# Patient Record
Sex: FEMALE | Race: WHITE | Employment: UNEMPLOYED | URBAN - METROPOLITAN AREA
[De-identification: names, ages, dates, MRNs, and addresses within clinical notes are randomized per-mention and may not be internally consistent; named-entity substitution may affect disease eponyms.]

---

## 2017-02-15 ENCOUNTER — OFFICE VISIT (OUTPATIENT)
Dept: FAMILY MEDICINE CLINIC | Facility: CLINIC | Age: 47
End: 2017-02-15

## 2017-02-15 VITALS
BODY MASS INDEX: 19 KG/M2 | WEIGHT: 116 LBS | HEART RATE: 63 BPM | OXYGEN SATURATION: 99 % | RESPIRATION RATE: 18 BRPM | TEMPERATURE: 98 F

## 2017-02-15 DIAGNOSIS — Z20.818 STREP THROAT EXPOSURE: ICD-10-CM

## 2017-02-15 DIAGNOSIS — J02.9 ACUTE PHARYNGITIS, UNSPECIFIED ETIOLOGY: Primary | ICD-10-CM

## 2017-02-15 LAB
CONTROL LINE PRESENT WITH A CLEAR BACKGROUND (YES/NO): YES YES/NO
STREP GRP A CUL-SCR: NEGATIVE

## 2017-02-15 PROCEDURE — 99213 OFFICE O/P EST LOW 20 MIN: CPT | Performed by: NURSE PRACTITIONER

## 2017-02-15 PROCEDURE — 87880 STREP A ASSAY W/OPTIC: CPT | Performed by: NURSE PRACTITIONER

## 2017-02-15 NOTE — PROGRESS NOTES
CHIEF COMPLAINT:   Patient presents with:  Sore Throat: spouse and son with strep, here with daugther to r/o strep        HPI:   Zacarias Moya is a 42year old female presents to clinic with complaint of sore throat.  Hx of acid reflex, throat irritation non-injected, no bulging, retraction, or fluid bilaterally  NOSE: nostrils patent, no nasal discharge, right turbinate mildly edematous no erythema. THROAT: oral mucosa pink, moist. Posterior pharynx non erythematous, no exudate.    NECK: supple  LUNGS: cl

## 2017-02-27 ENCOUNTER — APPOINTMENT (OUTPATIENT)
Dept: CT IMAGING | Facility: HOSPITAL | Age: 47
End: 2017-02-27
Attending: EMERGENCY MEDICINE
Payer: COMMERCIAL

## 2017-02-27 ENCOUNTER — HOSPITAL ENCOUNTER (EMERGENCY)
Facility: HOSPITAL | Age: 47
Discharge: HOME | End: 2017-02-28
Attending: EMERGENCY MEDICINE
Payer: COMMERCIAL

## 2017-02-27 ENCOUNTER — HOSPITAL ENCOUNTER (OUTPATIENT)
Age: 47
Discharge: ANOTHER HOSPITAL | End: 2017-02-27
Attending: EMERGENCY MEDICINE
Payer: COMMERCIAL

## 2017-02-27 ENCOUNTER — APPOINTMENT (OUTPATIENT)
Dept: MRI IMAGING | Facility: HOSPITAL | Age: 47
End: 2017-02-27
Attending: EMERGENCY MEDICINE
Payer: COMMERCIAL

## 2017-02-27 VITALS
OXYGEN SATURATION: 100 % | HEART RATE: 63 BPM | HEIGHT: 65 IN | DIASTOLIC BLOOD PRESSURE: 71 MMHG | BODY MASS INDEX: 19.66 KG/M2 | RESPIRATION RATE: 19 BRPM | WEIGHT: 118 LBS | TEMPERATURE: 98 F | SYSTOLIC BLOOD PRESSURE: 126 MMHG

## 2017-02-27 VITALS
DIASTOLIC BLOOD PRESSURE: 71 MMHG | TEMPERATURE: 98 F | SYSTOLIC BLOOD PRESSURE: 110 MMHG | OXYGEN SATURATION: 98 % | HEART RATE: 59 BPM | RESPIRATION RATE: 16 BRPM

## 2017-02-27 DIAGNOSIS — R20.2 PARESTHESIAS: Primary | ICD-10-CM

## 2017-02-27 DIAGNOSIS — M54.12 CERVICAL RADICULOPATHY: Primary | ICD-10-CM

## 2017-02-27 PROCEDURE — 70450 CT HEAD/BRAIN W/O DYE: CPT

## 2017-02-27 PROCEDURE — 72141 MRI NECK SPINE W/O DYE: CPT | Performed by: RADIOLOGY

## 2017-02-27 PROCEDURE — 99215 OFFICE O/P EST HI 40 MIN: CPT

## 2017-02-27 PROCEDURE — 99284 EMERGENCY DEPT VISIT MOD MDM: CPT

## 2017-02-27 NOTE — ED INITIAL ASSESSMENT (HPI)
Pt states that last night before bed she started having some numbness to her Right arm. Pt denies any injury but does lift weights twice a week. Pt is able to move her arm and does not have any pain to her rt arm.   Pt has been using her rt arm all day wit

## 2017-02-28 RX ORDER — METHYLPREDNISOLONE 4 MG/1
TABLET ORAL
Qty: 1 PACKAGE | Refills: 0 | Status: SHIPPED | OUTPATIENT
Start: 2017-02-28 | End: 2017-03-05

## 2017-02-28 NOTE — ED PROVIDER NOTES
Patient Seen in: [facility] AND [facility] Emergency Department    History   Patient presents with:  Numbness Weakness (neurologic)    Stated Complaint: R arm numbness    HPI  The patient is a 27-year-old female who presents to the emergency department with righ Onset   • Heart Disease Father    • Prostate Cancer Father    • Heart Disease Paternal Grandfather    • Depression Other          Smoking Status: Former Smoker                   Packs/Day: 0.50  Years: 21        Quit date: 07/16/2010    Alcohol Use: Yes tenderness (Anterior and lateral soft tissue). She exhibits normal range of motion, no bony tenderness, no swelling, no effusion, normal pulse and normal strength. Neurological: She is alert and oriented to person, place, and time.  She has normal strengt Marquise Hawley M.D. Radiology exams  Viewed and reviewed by myself and findings discussed with patient including need for follow up    Patient's neuro exam remained unchanged with stable vital signs.   Comfortable with discharge plan    Disposition and Plan

## 2017-02-28 NOTE — ED INITIAL ASSESSMENT (HPI)
Pt sts that her right arm is totally numb since yesterday. Pt was scene at [facility] in [place] and sent here for further evaluation.

## 2017-02-28 NOTE — ED PROVIDER NOTES
Remy Juan is a 42year old female who presents for evaluation of right arm numbness  HPI:   Pt complains of right arm numbness which started yesterday night. Patient states the entire arm feels numb.   She has had the symptoms without any spontaneous REVIEW OF SYSTEMS:     GENERAL: Denies fever or Chills.     EYES:denies blurred vision or double vision  HEENT: denies sore throat   LUNGS: denies shortness of breath or cough  CARDIOVASCULAR: denies chest pain   GI: denies abdominal pain, vomiting  :

## 2017-02-28 NOTE — ED NOTES
Pt is ambulatory to the . She is moving all extremities and only has a \"pins and needles feeling\" to her rt arm. Pt denies neck pain, headaches and any vision changes. No slurred speech noted.   Hand grasps are equal and bilateral radial pulses are s

## 2017-02-28 NOTE — ED NOTES
Md recommends that pt present to the ED for further testing. Pt verbalized understanding and will drive herself to [facility].  ED  was notified and informed of pt's information.

## 2017-02-28 NOTE — ED NOTES
Pt reports right arm numbness. PT reports working out with a , and recent move. Pt concerned for pinched nerve in shoulder. Pt denies cp, headache, blurred vision, dizziness. Pt's strength intact bilaterally. Negative BEFAST.  Pt resting in bed comfo

## 2017-05-25 ENCOUNTER — OFFICE VISIT (OUTPATIENT)
Dept: OTOLARYNGOLOGY | Facility: CLINIC | Age: 47
End: 2017-05-25

## 2017-05-25 VITALS
WEIGHT: 118 LBS | HEIGHT: 65 IN | TEMPERATURE: 99 F | DIASTOLIC BLOOD PRESSURE: 62 MMHG | BODY MASS INDEX: 19.66 KG/M2 | SYSTOLIC BLOOD PRESSURE: 110 MMHG

## 2017-05-25 DIAGNOSIS — J02.9 PHARYNGITIS, UNSPECIFIED ETIOLOGY: Primary | ICD-10-CM

## 2017-05-25 PROCEDURE — 99212 OFFICE O/P EST SF 10 MIN: CPT | Performed by: OTOLARYNGOLOGY

## 2017-05-25 PROCEDURE — 99213 OFFICE O/P EST LOW 20 MIN: CPT | Performed by: OTOLARYNGOLOGY

## 2017-05-25 RX ORDER — DAPSONE 75 MG/G
GEL TOPICAL
Status: ON HOLD | COMMUNITY
Start: 2017-04-07 | End: 2017-08-14

## 2017-05-25 NOTE — PATIENT INSTRUCTIONS
Pharyngitis (Sore Throat), Report Pending    Pharyngitis (sore throat) is often due to a virus. It can also be caused by the streptococcus, or strep, bacterium, often called strep throat.  Both viral and strep infections can cause throat pain that is wors · For children: Use acetaminophen for fever, fussiness, or discomfort.  In infants older than 10months of age, you may use ibuprofen instead of acetaminophen. Talk with your child's healthcare provider before giving these medicines if your child has chronic · Signs of dehydration (very dark urine or no urine, sunken eyes, dizziness)  · Trouble breathing or noisy breathing  · Muffled voice  · New rash  · Child appears to be getting sicker  Date Last Reviewed: 4/13/2015  © 4134-6837 [employer]. 7

## 2017-05-25 NOTE — PROGRESS NOTES
Yael Weller is a 42year old female. Patient presents with:  Sore Throat: for 2 weeks, redness and inflammation     HPI:   For the last 2 weeks she's been experiencing discomfort in her throat. It was very sore and a red.  It is improved slightly in the Inspection - Normal.   Constitutional Normal Overall appearance - Normal.   Head/Face Normal Facial features - Normal. Eyebrows - Normal. Skull - Normal.   Oral/Oropharynx Normal Lips - Normal, Tonsils - Normal, Tongue - Normal   Normal pharynx and indirec

## 2017-08-12 ENCOUNTER — HOSPITAL ENCOUNTER (OUTPATIENT)
Age: 47
Discharge: HOME | End: 2017-08-12
Attending: EMERGENCY MEDICINE
Payer: COMMERCIAL

## 2017-08-12 ENCOUNTER — APPOINTMENT (OUTPATIENT)
Dept: GENERAL RADIOLOGY | Age: 47
End: 2017-08-12
Attending: EMERGENCY MEDICINE
Payer: COMMERCIAL

## 2017-08-12 VITALS
SYSTOLIC BLOOD PRESSURE: 102 MMHG | HEART RATE: 88 BPM | TEMPERATURE: 99 F | DIASTOLIC BLOOD PRESSURE: 61 MMHG | RESPIRATION RATE: 16 BRPM | OXYGEN SATURATION: 100 %

## 2017-08-12 DIAGNOSIS — J18.9 COMMUNITY ACQUIRED PNEUMONIA: Primary | ICD-10-CM

## 2017-08-12 LAB
S PYO AG THROAT QL: NEGATIVE
URINE BILIRUBIN: NEGATIVE
URINE COLOR: YELLOW
URINE GLUCOSE: NEGATIVE MG/DL
URINE KETONES: NEGATIVE MG/DL
URINE NITRITE: NEGATIVE
URINE PH: 6.5
URINE PROTEIN: NEGATIVE MG /DL
URINE SPECIFIC GRAVITY: 1.01
URINE UROBILINOGEN: 0.2 MG/DL

## 2017-08-12 PROCEDURE — 71020 XR CHEST PA + LAT CHEST (CPT=71020): CPT | Performed by: EMERGENCY MEDICINE

## 2017-08-12 PROCEDURE — 87430 STREP A AG IA: CPT

## 2017-08-12 PROCEDURE — 99214 OFFICE O/P EST MOD 30 MIN: CPT

## 2017-08-12 PROCEDURE — 81002 URINALYSIS NONAUTO W/O SCOPE: CPT

## 2017-08-12 PROCEDURE — 87086 URINE CULTURE/COLONY COUNT: CPT | Performed by: EMERGENCY MEDICINE

## 2017-08-12 RX ORDER — LEVOFLOXACIN 750 MG/1
750 TABLET ORAL DAILY
Qty: 7 TABLET | Refills: 0 | Status: ON HOLD | OUTPATIENT
Start: 2017-08-12 | End: 2017-08-14

## 2017-08-12 NOTE — ED PROVIDER NOTES
Stephany Tsai is a 42year old female who presents for evaluation of a fever  HPI:   Pt complains of a fever which has been present for the last 3 days.   Patient's symptoms are accompanied by joint pains patient states she has had a slight cough and feels breath   CARDIOVASCULAR: positive  chest pain   GI: denies abdominal pain, vomiting, diarrhea   : denies dysuria  MUSCULOSKELETAL: denies back pain  NEURO: denies headache  Skin denies painful rash    EXAM:      GENERAL: Patient is awake and alert,in no lobe worrisome of pneumonia. The left     lung is clear. No pleural effusions. BONES: No fracture or visible bony lesion. OTHER: Negative.            Dictated by (CST): Coral Ayers MD on 8/12/2017 at 11:42         Approved by (CST): Latisha

## 2017-08-12 NOTE — ED INITIAL ASSESSMENT (HPI)
Patient states having fever 102F x 3 days, body aches, fatigue, headache, loss of appetite and dry cough. Last dose of Ibuprofen at 0900 today. Patient denies SOB or chest pain.   Patient denies ear pain, sore throat, sinus pressure/pain, n/v, abdominal p

## 2017-08-14 ENCOUNTER — HOSPITAL ENCOUNTER (INPATIENT)
Facility: HOSPITAL | Age: 47
LOS: 2 days | Discharge: HOME | DRG: 195 | End: 2017-08-16
Attending: EMERGENCY MEDICINE | Admitting: INTERNAL MEDICINE
Payer: COMMERCIAL

## 2017-08-14 ENCOUNTER — APPOINTMENT (OUTPATIENT)
Dept: GENERAL RADIOLOGY | Facility: HOSPITAL | Age: 47
DRG: 195 | End: 2017-08-14
Attending: EMERGENCY MEDICINE
Payer: COMMERCIAL

## 2017-08-14 DIAGNOSIS — J18.9 COMMUNITY ACQUIRED PNEUMONIA: Primary | ICD-10-CM

## 2017-08-14 PROBLEM — E87.1 HYPONATREMIA: Status: ACTIVE | Noted: 2017-08-14

## 2017-08-14 PROBLEM — E87.6 HYPOKALEMIA: Status: ACTIVE | Noted: 2017-08-14

## 2017-08-14 LAB
ANION GAP SERPL CALC-SCNC: 10 MMOL/L (ref 0–18)
BASOPHILS # BLD: 0 K/UL (ref 0–0.2)
BASOPHILS NFR BLD: 1 %
BILIRUB UR QL: NEGATIVE
BUN SERPL-MCNC: 8 MG/DL (ref 8–20)
BUN/CREAT SERPL: 10.4 (ref 10–20)
CALCIUM SERPL-MCNC: 8.8 MG/DL (ref 8.5–10.5)
CHLORIDE SERPL-SCNC: 98 MMOL/L (ref 95–110)
CLARITY UR: CLEAR
CO2 SERPL-SCNC: 24 MMOL/L (ref 22–32)
COLOR UR: YELLOW
CREAT SERPL-MCNC: 0.77 MG/DL (ref 0.5–1.5)
EOSINOPHIL # BLD: 0 K/UL (ref 0–0.7)
EOSINOPHIL NFR BLD: 1 %
ERYTHROCYTE [DISTWIDTH] IN BLOOD BY AUTOMATED COUNT: 12.4 % (ref 11–15)
GLUCOSE SERPL-MCNC: 108 MG/DL (ref 70–99)
GLUCOSE UR-MCNC: NEGATIVE MG/DL
HCT VFR BLD AUTO: 39 % (ref 35–48)
HGB BLD-MCNC: 13.5 G/DL (ref 12–16)
KETONES UR-MCNC: NEGATIVE MG/DL
LACTATE SERPL-SCNC: 1.5 MMOL/L (ref 0.5–2.2)
LYMPHOCYTES # BLD: 0.9 K/UL (ref 1–4)
LYMPHOCYTES NFR BLD: 21 %
MCH RBC QN AUTO: 35.2 PG (ref 27–32)
MCHC RBC AUTO-ENTMCNC: 34.7 G/DL (ref 32–37)
MCV RBC AUTO: 101.3 FL (ref 80–100)
MONOCYTES # BLD: 0.6 K/UL (ref 0–1)
MONOCYTES NFR BLD: 15 %
NEUTROPHILS # BLD AUTO: 2.7 K/UL (ref 1.8–7.7)
NEUTROPHILS NFR BLD: 63 %
NITRITE UR QL STRIP.AUTO: NEGATIVE
OSMOLALITY UR CALC.SUM OF ELEC: 273 MOSM/KG (ref 275–295)
PH UR: 5 [PH] (ref 5–8)
PLATELET # BLD AUTO: 132 K/UL (ref 140–400)
PMV BLD AUTO: 8.2 FL (ref 7.4–10.3)
POTASSIUM SERPL-SCNC: 3.5 MMOL/L (ref 3.3–5.1)
PROT UR-MCNC: NEGATIVE MG/DL
RBC # BLD AUTO: 3.85 M/UL (ref 3.7–5.4)
RBC #/AREA URNS AUTO: 12 /HPF
SODIUM SERPL-SCNC: 132 MMOL/L (ref 136–144)
SP GR UR STRIP: 1.01 (ref 1–1.03)
UROBILINOGEN UR STRIP-ACNC: <2
VIT C UR-MCNC: NEGATIVE MG/DL
WBC # BLD AUTO: 4.3 K/UL (ref 4–11)
WBC #/AREA URNS AUTO: 3 /HPF

## 2017-08-14 PROCEDURE — 71020 XR CHEST PA + LAT CHEST (CPT=71020): CPT | Performed by: EMERGENCY MEDICINE

## 2017-08-14 RX ORDER — ONDANSETRON 2 MG/ML
4 INJECTION INTRAMUSCULAR; INTRAVENOUS EVERY 6 HOURS PRN
Status: DISCONTINUED | OUTPATIENT
Start: 2017-08-14 | End: 2017-08-16

## 2017-08-14 RX ORDER — BISACODYL 10 MG
10 SUPPOSITORY, RECTAL RECTAL
Status: DISCONTINUED | OUTPATIENT
Start: 2017-08-14 | End: 2017-08-16

## 2017-08-14 RX ORDER — ACETAMINOPHEN 325 MG/1
650 TABLET ORAL EVERY 6 HOURS PRN
Status: DISCONTINUED | OUTPATIENT
Start: 2017-08-14 | End: 2017-08-16

## 2017-08-14 RX ORDER — ENOXAPARIN SODIUM 100 MG/ML
40 INJECTION SUBCUTANEOUS DAILY
Status: DISCONTINUED | OUTPATIENT
Start: 2017-08-15 | End: 2017-08-15

## 2017-08-14 RX ORDER — DOCUSATE SODIUM 100 MG/1
100 CAPSULE, LIQUID FILLED ORAL 2 TIMES DAILY
Status: DISCONTINUED | OUTPATIENT
Start: 2017-08-14 | End: 2017-08-16

## 2017-08-14 RX ORDER — IBUPROFEN 600 MG/1
600 TABLET ORAL EVERY 8 HOURS PRN
Status: DISCONTINUED | OUTPATIENT
Start: 2017-08-14 | End: 2017-08-16

## 2017-08-14 RX ORDER — MULTIPLE VITAMINS W/ MINERALS TAB 9MG-400MCG
1 TAB ORAL DAILY
Status: DISCONTINUED | OUTPATIENT
Start: 2017-08-15 | End: 2017-08-16

## 2017-08-14 RX ORDER — PANTOPRAZOLE SODIUM 40 MG/1
40 TABLET, DELAYED RELEASE ORAL DAILY
Status: DISCONTINUED | OUTPATIENT
Start: 2017-08-15 | End: 2017-08-16

## 2017-08-15 LAB
ALBUMIN SERPL BCP-MCNC: 3 G/DL (ref 3.5–4.8)
ALBUMIN/GLOB SERPL: 1 {RATIO} (ref 1–2)
ALP SERPL-CCNC: 79 U/L (ref 32–100)
ALT SERPL-CCNC: 48 U/L (ref 14–54)
ANION GAP SERPL CALC-SCNC: 4 MMOL/L (ref 0–18)
AST SERPL-CCNC: 40 U/L (ref 15–41)
BASOPHILS # BLD: 0 K/UL (ref 0–0.2)
BASOPHILS NFR BLD: 1 %
BILIRUB SERPL-MCNC: 0.3 MG/DL (ref 0.3–1.2)
BUN SERPL-MCNC: 4 MG/DL (ref 8–20)
BUN/CREAT SERPL: 6.3 (ref 10–20)
CALCIUM SERPL-MCNC: 8.1 MG/DL (ref 8.5–10.5)
CHLORIDE SERPL-SCNC: 112 MMOL/L (ref 95–110)
CO2 SERPL-SCNC: 23 MMOL/L (ref 22–32)
CREAT SERPL-MCNC: 0.64 MG/DL (ref 0.5–1.5)
EOSINOPHIL # BLD: 0.1 K/UL (ref 0–0.7)
EOSINOPHIL NFR BLD: 2 %
ERYTHROCYTE [DISTWIDTH] IN BLOOD BY AUTOMATED COUNT: 12.7 % (ref 11–15)
GLOBULIN PLAS-MCNC: 3 G/DL (ref 2.5–3.7)
GLUCOSE SERPL-MCNC: 106 MG/DL (ref 70–99)
HCT VFR BLD AUTO: 36.7 % (ref 35–48)
HGB BLD-MCNC: 12.5 G/DL (ref 12–16)
L PNEUMO AG UR QL: NEGATIVE
LYMPHOCYTES # BLD: 1.4 K/UL (ref 1–4)
LYMPHOCYTES NFR BLD: 37 %
MCH RBC QN AUTO: 34.7 PG (ref 27–32)
MCHC RBC AUTO-ENTMCNC: 34.1 G/DL (ref 32–37)
MCV RBC AUTO: 101.5 FL (ref 80–100)
MONOCYTES # BLD: 0.9 K/UL (ref 0–1)
MONOCYTES NFR BLD: 23 %
NEUTROPHILS # BLD AUTO: 1.4 K/UL (ref 1.8–7.7)
NEUTROPHILS NFR BLD: 37 %
OSMOLALITY UR CALC.SUM OF ELEC: 285 MOSM/KG (ref 275–295)
PLATELET # BLD AUTO: 115 K/UL (ref 140–400)
PMV BLD AUTO: 8.9 FL (ref 7.4–10.3)
POTASSIUM SERPL-SCNC: 4.6 MMOL/L (ref 3.3–5.1)
POTASSIUM SERPL-SCNC: 4.6 MMOL/L (ref 3.3–5.1)
PROT SERPL-MCNC: 6 G/DL (ref 5.9–8.4)
RBC # BLD AUTO: 3.62 M/UL (ref 3.7–5.4)
SODIUM SERPL-SCNC: 139 MMOL/L (ref 136–144)
STREP PNEUMO ANTIGEN, URINE: NEGATIVE
WBC # BLD AUTO: 3.8 K/UL (ref 4–11)

## 2017-08-15 PROCEDURE — 99222 1ST HOSP IP/OBS MODERATE 55: CPT | Performed by: INTERNAL MEDICINE

## 2017-08-15 RX ORDER — 0.9 % SODIUM CHLORIDE 0.9 %
VIAL (ML) INJECTION
Status: COMPLETED
Start: 2017-08-15 | End: 2017-08-15

## 2017-08-15 NOTE — ED PROVIDER NOTES
Patient Seen in: [facility] Emergency Department    History   Patient presents with:  Pneumonia    Stated Complaint: pneumonia     HPI    Patient presents to the emergency department today complaining of cyclical fevers, dull throbbing headache, c Disease Paternal Grandfather    • Depression Other        Smoking status: Former Smoker                                                              Packs/day: 0.50      Years: 20.00        Quit date: 7/16/2010  Smokeless tobacco: Never Used Osmolality 273 (*)     All other components within normal limits   CBC W/ DIFFERENTIAL - Abnormal; Notable for the following:     .3 (*)     MCH 35.2 (*)      (*)     Lymphocyte Absolute 0.9 (*)     All other components within normal limits [address]  [phone]            Medications Prescribed:  Current Discharge Medication List        Present on Admission  Date Reviewed: 5/25/2017          ICD-10-CM Noted POA    Community acquired pneumonia J18.9 8/14/2017 Unknown    Hypokalemia E87.6 8

## 2017-08-15 NOTE — ED INITIAL ASSESSMENT (HPI)
Pt dx with pneumonia. Pt taking levofloxacin and on day 3 of treatment. Pt reports chills, faint, night sweats. Per pt, \"I can't control the fever.  I have been getting a headache\"

## 2017-08-15 NOTE — PAYOR COMM NOTE
Admit Orders     Start     Ordered    08/14/17 2133  Admit to inpatient Once  Once     Ordering Provider:  Donavon Dance, MD   Question Answer Comment   Admitting Physician Clarke Eubanks    Diagnosis Community acquired pneumonia    Level of Care Ac mild shortness of breath. She denies abdominal pain or diarrhea. [MP.1]    Past Medical History:   Diagnosis Date   • Acne    • Acute meniscal tear of right knee     s/p arthroscopy 2006   • Arthritis    • Chronic UTI (urinary tract infection)    • Esoph 89  Resp: 14  Temp: 99.7 °F (37.6 °C)  Temp src: Oral  SpO2: 98 %  O2 Device: None (Room air)[MP.2]    Current:[MP.1]/73   Pulse 86   Temp 99.9 °F (37.7 °C) (Oral)   Resp 20   Ht 165.1 cm (5' 5\")   Wt 55.3 kg   LMP 07/24/2017 (Exact Date)   SpO2 100 individual orders.    LACTIC ACID, PLASMA   URINALYSIS WITH CULTURE REFLEX   RAINBOW DRAW BLUE   RAINBOW DRAW GOLD   RAINBOW DRAW LAVENDER   RAINBOW DRAW LIGHT GREEN   RAINBOW DRAW DARK GREEN   RAINBOW DRAW LAVENDER TALL (BNP)   BLOOD CULTURE   BLOOD CULTUR Electronically Signed     Author:  Duane Beer, MD Service:  Internal Medicine Author Type:  Physician    Filed:  8/15/2017 10:35 AM Date of Service:  8/15/2017 10:06 AM Status:  Angela See Transcription    :  Duane Beer, MD (Physician) daily. ALLERGIES:  Penicillin which caused nausea and vomiting. FAMILY HISTORY:  There is a family history of colon polyps. The patient is due for her next colonoscopy in 2018. SOCIAL HISTORY:  The patient is . She lives with her . menorrhagia. We will check iron studies as well. 4.   Thrombocytopenia. This may be due to the patient's current pneumonia as she was mildly thrombocytopenic on admission even prior to receiving Lovenox.   We will, however, hold her Lovenox for now given Action Dose Route User    8/14/2017 2210 Given 4 mg Intravenous Yakov Weber, RN      Pantoprazole Sodium (PROTONIX) EC tab 40 mg     Date Action Dose Route User    8/15/2017 8447 Given 40 mg Oral Benton Rueda RN      potassium chloride 40 mEq in

## 2017-08-15 NOTE — H&P
[facility]    PATIENT'S NAME: Clyde Hawk   ATTENDING PHYSICIAN: Mary De Anda.  Jenny Hernandez MD   PATIENT ACCOUNT#:   [id number]    LOCATION:  [address] [place] [address] RECORD #:   [id number]       YOB: 1970  ADMISSION DATE:       08/14/20 7year-old twins, a boy and a girl. She previously smoked cigarettes socially on and off for 14 years but quit completely in 2010. She is not currently working outside the home. She exercises regularly with a .       PHYSICAL EXAMINATION:    GENER platelets to 910.  5.   Deep vein thrombosis prophylaxis. We will hold Lovenox as above due to thrombocytopenia. The patient is ambulatory and has been encouraged to ambulate today. We will use SCDs while in bed. Dictated By Christopher Fan Ace, MD  d

## 2017-08-16 ENCOUNTER — TELEPHONE (OUTPATIENT)
Dept: INTERNAL MEDICINE CLINIC | Facility: CLINIC | Age: 47
End: 2017-08-16

## 2017-08-16 VITALS
RESPIRATION RATE: 18 BRPM | BODY MASS INDEX: 20.33 KG/M2 | OXYGEN SATURATION: 100 % | SYSTOLIC BLOOD PRESSURE: 114 MMHG | TEMPERATURE: 98 F | DIASTOLIC BLOOD PRESSURE: 63 MMHG | HEART RATE: 59 BPM | WEIGHT: 122 LBS | HEIGHT: 65 IN

## 2017-08-16 DIAGNOSIS — J18.9 PNEUMONIA OF RIGHT UPPER LOBE DUE TO INFECTIOUS ORGANISM: Primary | ICD-10-CM

## 2017-08-16 PROBLEM — D64.9 ANEMIA: Status: ACTIVE | Noted: 2017-08-16

## 2017-08-16 PROBLEM — D69.6 THROMBOCYTOPENIA (HCC): Status: ACTIVE | Noted: 2017-08-16

## 2017-08-16 PROBLEM — D69.6 THROMBOCYTOPENIA: Status: ACTIVE | Noted: 2017-08-16

## 2017-08-16 LAB
BASOPHILS # BLD: 0 K/UL (ref 0–0.2)
BASOPHILS NFR BLD: 1 %
EOSINOPHIL # BLD: 0.2 K/UL (ref 0–0.7)
EOSINOPHIL NFR BLD: 5 %
ERYTHROCYTE [DISTWIDTH] IN BLOOD BY AUTOMATED COUNT: 12.9 % (ref 11–15)
FERRITIN SERPL IA-MCNC: 118 NG/ML (ref 11–307)
FOLATE SERPL-MCNC: 15.6 NG/ML
HCT VFR BLD AUTO: 35.3 % (ref 35–48)
HGB BLD-MCNC: 12.1 G/DL (ref 12–16)
IRON SATN MFR SERPL: 17 % (ref 15–50)
IRON SERPL-MCNC: 37 MCG/DL (ref 28–170)
LYMPHOCYTES # BLD: 2 K/UL (ref 1–4)
LYMPHOCYTES NFR BLD: 46 %
MCH RBC QN AUTO: 34.7 PG (ref 27–32)
MCHC RBC AUTO-ENTMCNC: 34.4 G/DL (ref 32–37)
MCV RBC AUTO: 101 FL (ref 80–100)
MONOCYTES # BLD: 0.6 K/UL (ref 0–1)
MONOCYTES NFR BLD: 14 %
NEUTROPHILS # BLD AUTO: 1.5 K/UL (ref 1.8–7.7)
NEUTROPHILS NFR BLD: 35 %
PLATELET # BLD AUTO: 135 K/UL (ref 140–400)
PMV BLD AUTO: 8.5 FL (ref 7.4–10.3)
RBC # BLD AUTO: 3.49 M/UL (ref 3.7–5.4)
TIBC SERPL-MCNC: 222 MCG/DL (ref 228–428)
TRANSFERRIN SERPL-MCNC: 168 MG/DL (ref 192–382)
VIT B12 SERPL-MCNC: 395 PG/ML (ref 181–914)
WBC # BLD AUTO: 4.3 K/UL (ref 4–11)

## 2017-08-16 PROCEDURE — 99239 HOSP IP/OBS DSCHRG MGMT >30: CPT | Performed by: INTERNAL MEDICINE

## 2017-08-16 RX ORDER — CEFUROXIME AXETIL 500 MG/1
500 TABLET ORAL 2 TIMES DAILY
Qty: 16 TABLET | Refills: 0 | Status: SHIPPED | OUTPATIENT
Start: 2017-08-16 | End: 2017-08-30 | Stop reason: ALTCHOICE

## 2017-08-16 RX ORDER — IBUPROFEN 600 MG/1
600 TABLET ORAL EVERY 8 HOURS PRN
Qty: 30 TABLET | Refills: 0 | Status: SHIPPED | OUTPATIENT
Start: 2017-08-16 | End: 2018-05-07 | Stop reason: ALTCHOICE

## 2017-08-16 RX ORDER — ACETAMINOPHEN 325 MG/1
650 TABLET ORAL EVERY 6 HOURS PRN
Qty: 30 TABLET | Refills: 0 | Status: SHIPPED | OUTPATIENT
Start: 2017-08-16 | End: 2017-08-30 | Stop reason: ALTCHOICE

## 2017-08-16 RX ORDER — AZITHROMYCIN 250 MG/1
TABLET, FILM COATED ORAL
Qty: 4 TABLET | Refills: 0 | Status: SHIPPED | OUTPATIENT
Start: 2017-08-16 | End: 2017-08-16

## 2017-08-16 RX ORDER — AZITHROMYCIN 250 MG/1
TABLET, FILM COATED ORAL
Qty: 4 TABLET | Refills: 0 | Status: SHIPPED | OUTPATIENT
Start: 2017-08-16 | End: 2017-08-30 | Stop reason: ALTCHOICE

## 2017-08-16 NOTE — PLAN OF CARE
Patient/Family Goals    • Patient/Family Long Term Goal Adequate for Discharge    • Patient/Family Short Term Goal Adequate for Discharge        RESPIRATORY - ADULT    • Achieves optimal ventilation and oxygenation Adequate for Discharge

## 2017-08-16 NOTE — PLAN OF CARE
Problem: Patient/Family Goals  Goal: Patient/Family Long Term Goal  Patient's Long Term Goal: Return home to baseline    Interventions:  - IV abx  -Hydration  -Activity  - See additional Care Plan goals for specific interventions    Outcome: Progressing

## 2017-08-16 NOTE — DISCHARGE SUMMARY
[facility]    PATIENT'S NAME: Lynne Lr   ATTENDING PHYSICIAN: Kaylen Jonas.  Shanita Glaser MD   PATIENT ACCOUNT#:   [id number]    LOCATION:  [address] [place] [address] RECORD #:   [id number]       YOB: 1970  ADMISSION DATE:       08/14/20 11:29:27  [facility] [id number]  OMEGA/

## 2017-08-16 NOTE — PLAN OF CARE
Patient/Family Goals    • Patient/Family Long Term Goal Progressing    • Patient/Family Short Term Goal Progressing        RESPIRATORY - ADULT    • Achieves optimal ventilation and oxygenation Progressing            Patient continues to improve.  Patient co

## 2017-08-16 NOTE — PROGRESS NOTES
[facility]    Progress Note    Zacarias Drought Patient Status:  Inpatient    1970 MRN [id number]   Location [facility]5W Attending Salma Mejia MD   Hosp Day # 2 PCP Venice Choi MD         Assessment and Plan: 1499 ml       Wt Readings from Last 3 Encounters:  08/14/17 : 122 lb (55.3 kg)  05/25/17 : 118 lb (53.5 kg)  02/27/17 : 118 lb (53.5 kg)        Constitutional: alert and oriented x3 in no acute distress  HEENT- EOMI, PERRL  Nose/Mouth/Throat: pharynx witho BACUR  Few*   EPIUR  Few       No results for input(s): URINE, CULTI, BLDSMR in the last 72 hours. Imaging:  Xr Chest Pa + Lat Chest (dyp=12790)    Result Date: 8/14/2017  CONCLUSION:   Worsening right upper lobe pneumonia since 8/12/2017.  Advise ra

## 2017-08-17 ENCOUNTER — TELEPHONE (OUTPATIENT)
Dept: CARDIOLOGY UNIT | Facility: HOSPITAL | Age: 47
End: 2017-08-17

## 2017-08-17 NOTE — PAYOR COMM NOTE
--------------  DISCHARGE REVIEW    Payor: [address] #:  [id number]  Authorization Number: [id number]    Admit date: 8/14/17  Admit time:  2052  Discharge Date: 8/16/2017  3:03 PM     Admitting Physician: Eren Jung had mild anemia with a hemoglobin of 12.1. Vitamin B12 level was normal at 395. Folate level was normal at 15.6. Iron level was 37 with ferritin level of 118. The patient had mild thrombocytopenia with a platelet count of 600.   Lovenox was held due to

## 2017-08-28 ENCOUNTER — HOSPITAL ENCOUNTER (OUTPATIENT)
Dept: GENERAL RADIOLOGY | Facility: HOSPITAL | Age: 47
Discharge: HOME | End: 2017-08-28
Attending: INTERNAL MEDICINE
Payer: COMMERCIAL

## 2017-08-28 DIAGNOSIS — J18.9 PNEUMONIA OF RIGHT UPPER LOBE DUE TO INFECTIOUS ORGANISM: ICD-10-CM

## 2017-08-28 PROCEDURE — 71020 XR CHEST PA + LAT CHEST (CPT=71020): CPT | Performed by: INTERNAL MEDICINE

## 2017-08-30 ENCOUNTER — OFFICE VISIT (OUTPATIENT)
Dept: INTERNAL MEDICINE CLINIC | Facility: CLINIC | Age: 47
End: 2017-08-30

## 2017-08-30 VITALS
OXYGEN SATURATION: 99 % | BODY MASS INDEX: 20.46 KG/M2 | HEART RATE: 63 BPM | SYSTOLIC BLOOD PRESSURE: 112 MMHG | HEIGHT: 65 IN | WEIGHT: 122.81 LBS | DIASTOLIC BLOOD PRESSURE: 72 MMHG | TEMPERATURE: 98 F

## 2017-08-30 DIAGNOSIS — R06.00 DYSPNEA, UNSPECIFIED TYPE: ICD-10-CM

## 2017-08-30 DIAGNOSIS — J18.9 PNEUMONIA OF RIGHT UPPER LOBE DUE TO INFECTIOUS ORGANISM: Primary | ICD-10-CM

## 2017-08-30 DIAGNOSIS — K21.9 GASTROESOPHAGEAL REFLUX DISEASE WITHOUT ESOPHAGITIS: ICD-10-CM

## 2017-08-30 DIAGNOSIS — D75.89 MACROCYTOSIS: ICD-10-CM

## 2017-08-30 PROCEDURE — 99212 OFFICE O/P EST SF 10 MIN: CPT | Performed by: INTERNAL MEDICINE

## 2017-08-30 PROCEDURE — 99214 OFFICE O/P EST MOD 30 MIN: CPT | Performed by: INTERNAL MEDICINE

## 2017-08-30 NOTE — PROGRESS NOTES
Stephany Tsai is a 42year old female. HPI:   Patient presents with:  Hospital F/U: Admitted at [address] [place] [address] 8/14-8/16. DX: pneumonia. Had repeat CXR 8/28/17.  Completed oral antibiotics      30-year-[facility]  :   No date: CHOLECYSTECTOMY  : COLONOSCOPY  2007: EGD  No date: KNEE SURGERY      Comment: 2006   Family History   Problem Relation Age of Onset   • Depression Other    • Heart Disease Father    • Prostate Cancer Father lesions  Neck/Thyroid: neck supple; no thyromegaly  Cardiovascular: RRR, S1, S2, no S3 or murmur  Respiratory: lungs without crackles or wheezes  Abdomen: normoactive bowel sounds, soft, non-tender and non-distended  Extremities: no clubbing, cyanosis or e This Visit:  No orders of the defined types were placed in this encounter.       Meds This Visit:    No prescriptions requested or ordered in this encounter       Imaging & Referrals:  None     8/30/2017  Carla Leon MD

## 2017-09-06 ENCOUNTER — HOSPITAL ENCOUNTER (OUTPATIENT)
Dept: RESPIRATORY THERAPY | Facility: HOSPITAL | Age: 47
Discharge: HOME | End: 2017-09-06
Attending: INTERNAL MEDICINE
Payer: COMMERCIAL

## 2017-09-06 DIAGNOSIS — R06.00 DYSPNEA: ICD-10-CM

## 2017-09-06 DIAGNOSIS — R06.00 DYSPNEA, UNSPECIFIED TYPE: ICD-10-CM

## 2017-09-06 PROCEDURE — 94729 DIFFUSING CAPACITY: CPT | Performed by: INTERNAL MEDICINE

## 2017-09-06 PROCEDURE — 94726 PLETHYSMOGRAPHY LUNG VOLUMES: CPT | Performed by: INTERNAL MEDICINE

## 2017-09-06 PROCEDURE — 94060 EVALUATION OF WHEEZING: CPT | Performed by: INTERNAL MEDICINE

## 2017-09-12 ENCOUNTER — TELEPHONE (OUTPATIENT)
Dept: INTERNAL MEDICINE CLINIC | Facility: CLINIC | Age: 47
End: 2017-09-12

## 2017-09-12 NOTE — TELEPHONE ENCOUNTER
Patient is looking for results from her pulmonary test that was done last week at [address] [place] [address].

## 2017-09-12 NOTE — ADDENDUM NOTE
Encounter addended by: Reji Plasencia MD on: 9/12/2017 12:13 AM<BR>    Actions taken: Sign clinical note, Charge Capture section accepted

## 2017-09-12 NOTE — PROCEDURES
[facility] [place] [facility]    Patient's Name Zacarias Moya MRN [id number]    1970 Pulmonologist Azalia Anguiano MD   Location [facility] PCP Venice Choi MD     IMPRESSION:    The PFTs are Normal.    No change in ruthann

## 2017-09-13 NOTE — TELEPHONE ENCOUNTER
Relayed MD message to patient, patient would like to know if she needs to do anything further? Please advise.

## 2017-09-29 ENCOUNTER — OFFICE VISIT (OUTPATIENT)
Dept: OTOLARYNGOLOGY | Facility: CLINIC | Age: 47
End: 2017-09-29

## 2017-09-29 VITALS
RESPIRATION RATE: 16 BRPM | WEIGHT: 118 LBS | SYSTOLIC BLOOD PRESSURE: 96 MMHG | BODY MASS INDEX: 19.66 KG/M2 | TEMPERATURE: 99 F | DIASTOLIC BLOOD PRESSURE: 60 MMHG | HEIGHT: 65 IN

## 2017-09-29 DIAGNOSIS — J02.9 PHARYNGITIS, UNSPECIFIED ETIOLOGY: Primary | ICD-10-CM

## 2017-09-29 DIAGNOSIS — J01.21 ACUTE RECURRENT ETHMOIDAL SINUSITIS: ICD-10-CM

## 2017-09-29 PROCEDURE — 99212 OFFICE O/P EST SF 10 MIN: CPT | Performed by: OTOLARYNGOLOGY

## 2017-09-29 PROCEDURE — 99213 OFFICE O/P EST LOW 20 MIN: CPT | Performed by: OTOLARYNGOLOGY

## 2017-09-29 RX ORDER — DEXAMETHASONE 6 MG/1
TABLET ORAL
Qty: 3 TABLET | Refills: 0 | Status: SHIPPED | OUTPATIENT
Start: 2017-09-29 | End: 2018-02-08 | Stop reason: ALTCHOICE

## 2017-09-29 RX ORDER — SULFAMETHOXAZOLE AND TRIMETHOPRIM 800; 160 MG/1; MG/1
1 TABLET ORAL EVERY 12 HOURS
Qty: 14 TABLET | Refills: 0 | Status: SHIPPED | OUTPATIENT
Start: 2017-09-29 | End: 2018-02-08 | Stop reason: ALTCHOICE

## 2017-09-29 RX ORDER — GARLIC EXTRACT 500 MG
1 CAPSULE ORAL DAILY
COMMUNITY
End: 2019-09-19

## 2017-09-29 NOTE — PROGRESS NOTES
Stephany Tsai is a 41year old female. Patient presents with:  Sore Throat: Patient c/o sore throat for past 5 days with yellow spots to throat noted yesterday. Pt c/o bilateral ear pressure and nasal congestion that started yesterday.  Pain 5/10 to throa COLONOSCOPY  12/14, 2007: EGD  No date: KNEE SURGERY      Comment: 2006      REVIEW OF SYSTEMS    System Neg/Pos Details   Constitutional Negative Fatigue, fever and weight loss. ENMT Negative Drooling. Eyes Negative Blurred vision and vision changes. Septum deviated with turbinate hypertrophy bilaterally mucosa congestion.  Polyps are not noted in neither nasal cavity cobblestoning of his posterior pharyngeal wall erythema of the tonsils with multiple tonsilloliths       Current Outpatient Prescriptions Sherif Maharaj MD

## 2017-12-20 ENCOUNTER — TELEPHONE (OUTPATIENT)
Dept: INTERNAL MEDICINE CLINIC | Facility: CLINIC | Age: 47
End: 2017-12-20

## 2017-12-21 NOTE — TELEPHONE ENCOUNTER
Reports received from [facility] Re: EGD 12/15/17 which showed eosinophilic infiltrate of esophagus;  Rx as directed by Dr Shruthi Givens and Dr Dhruv Randall

## 2018-02-08 ENCOUNTER — OFFICE VISIT (OUTPATIENT)
Dept: INTERNAL MEDICINE CLINIC | Facility: CLINIC | Age: 48
End: 2018-02-08

## 2018-02-08 VITALS
WEIGHT: 126 LBS | DIASTOLIC BLOOD PRESSURE: 62 MMHG | SYSTOLIC BLOOD PRESSURE: 122 MMHG | HEART RATE: 72 BPM | OXYGEN SATURATION: 98 % | BODY MASS INDEX: 20.99 KG/M2 | RESPIRATION RATE: 18 BRPM | TEMPERATURE: 98 F | HEIGHT: 65 IN

## 2018-02-08 DIAGNOSIS — R07.89 CHEST DISCOMFORT: ICD-10-CM

## 2018-02-08 DIAGNOSIS — R06.00 DYSPNEA, UNSPECIFIED TYPE: ICD-10-CM

## 2018-02-08 DIAGNOSIS — K20.0 EOSINOPHILIC ESOPHAGITIS: ICD-10-CM

## 2018-02-08 DIAGNOSIS — Z00.00 PHYSICAL EXAM, ANNUAL: Primary | ICD-10-CM

## 2018-02-08 DIAGNOSIS — K21.9 GASTROESOPHAGEAL REFLUX DISEASE WITHOUT ESOPHAGITIS: ICD-10-CM

## 2018-02-08 DIAGNOSIS — D75.89 MACROCYTOSIS: ICD-10-CM

## 2018-02-08 PROCEDURE — 99396 PREV VISIT EST AGE 40-64: CPT | Performed by: INTERNAL MEDICINE

## 2018-02-08 NOTE — PROGRESS NOTES
Chris Canseco is a 41year old female. HPI:   Patient presents with:  Chest Congestion: Patient c/o chest fluid on occasion for past 1.5 weeks. Denies pain.     Cc: Physical exam    40 y/o FD with 10 d h/o chest soreness; no pleuritic pain; no cough; mi Take 1 tablet (40 mg total) by mouth daily. Before meal (Patient taking differently: Take 40 mg by mouth daily as needed.  Before meal ) Disp: 90 tablet Rfl: 3       Allergies:    Penicillins             Nausea and vomiting    Comment:As child proprioception intact  Skin: no rash or ulcerations             ASSESSMENT/PLAN:   Physical exam      Chest discomfort  Suspect due to +heartburn and possible esophageal spasm; suggested acid suppressive therapy (either PPI (omeprazole) or Zantac OTC)--> s in this encounter.       Meds This Visit:    No prescriptions requested or ordered in this encounter       Imaging & Referrals:  None     2/8/2018  Yen Yoo MD

## 2018-02-13 NOTE — PLAN OF CARE
Problem: RESPIRATORY - ADULT  Goal: Achieves optimal ventilation and oxygenation  INTERVENTIONS:  - Assess for changes in respiratory status  - Assess for changes in mentation and behavior  - Position to facilitate oxygenation and minimize respiratory effo Vaibhav notified of Dr. Ralph Forde message. He verbalized understanding.

## 2018-04-30 ENCOUNTER — OFFICE VISIT (OUTPATIENT)
Dept: OTOLARYNGOLOGY | Facility: CLINIC | Age: 48
End: 2018-04-30

## 2018-04-30 VITALS
BODY MASS INDEX: 22.5 KG/M2 | HEIGHT: 63 IN | WEIGHT: 127 LBS | SYSTOLIC BLOOD PRESSURE: 102 MMHG | DIASTOLIC BLOOD PRESSURE: 72 MMHG | TEMPERATURE: 98 F

## 2018-04-30 DIAGNOSIS — J02.9 PHARYNGITIS, UNSPECIFIED ETIOLOGY: Primary | ICD-10-CM

## 2018-04-30 PROCEDURE — 99214 OFFICE O/P EST MOD 30 MIN: CPT | Performed by: OTOLARYNGOLOGY

## 2018-04-30 PROCEDURE — 99213 OFFICE O/P EST LOW 20 MIN: CPT | Performed by: OTOLARYNGOLOGY

## 2018-04-30 NOTE — PROGRESS NOTES
Luz Henson is a 41year old female.   Patient presents with:  Sore Throat: pt reports sore throat, raw, possible tonsil stones,       HISTORY OF PRESENT ILLNESS  9/29/2017   Patient prevents for evaluation of a terrible sore throat started 3 days ago fo colonoscopy 2013 was normal   • Gastro-esophageal reflux disease with esophagitis    • Hemorrhoids    • History of bladder infections    • Meningitis     at age 10 or 17   • Pneumonia 08/2017   • S/P cholecystectomy        Past Surgical History:  2007: Normal. Hearing is jennifer   Skin Normal Inspection - Normal.        Lymph Detail  Normal Submental.  Tender Submandibular. Anterior cervical. Posterior cervical. Supraclavicular.               Nose/Mouth/Throat abNormal Nares - Right: Normal Left: Normal. Se

## 2018-05-07 ENCOUNTER — OFFICE VISIT (OUTPATIENT)
Dept: OTOLARYNGOLOGY | Facility: CLINIC | Age: 48
End: 2018-05-07

## 2018-05-07 VITALS
WEIGHT: 127 LBS | DIASTOLIC BLOOD PRESSURE: 60 MMHG | TEMPERATURE: 99 F | SYSTOLIC BLOOD PRESSURE: 100 MMHG | HEIGHT: 63 IN | BODY MASS INDEX: 22.5 KG/M2

## 2018-05-07 DIAGNOSIS — J02.9 PHARYNGITIS, UNSPECIFIED ETIOLOGY: Primary | ICD-10-CM

## 2018-05-07 PROCEDURE — 99212 OFFICE O/P EST SF 10 MIN: CPT | Performed by: OTOLARYNGOLOGY

## 2018-05-07 PROCEDURE — 99213 OFFICE O/P EST LOW 20 MIN: CPT | Performed by: OTOLARYNGOLOGY

## 2018-05-07 RX ORDER — CLINDAMYCIN HYDROCHLORIDE 300 MG/1
CAPSULE ORAL
Qty: 21 CAPSULE | Refills: 0 | Status: SHIPPED | OUTPATIENT
Start: 2018-05-07 | End: 2018-09-26

## 2018-05-07 NOTE — PROGRESS NOTES
Yael Weller is a 41year old female. Patient presents with:   Follow - Up: discuss results ,requests thyroid testing  Throat Problem: patient states a new issue is a lump in throat ,tender when touched x   Cough/URI: green /yellow mucus ,      HISTORY O Past Medical History:   Diagnosis Date   • Acne    • Acute meniscal tear of right knee     s/p arthroscopy 2006   • Arthritis    • Chronic UTI (urinary tract infection)    • Esophageal reflux    • Family history of colonic polyps     colonoscopy 2013 w features - Normal. Eyebrows - Normal. Skull - Normal.             Ears Normal Inspection - Right: Normal, Left: Normal. Canal - Right: Normal, Left: Normal. TM - Right: Normal, Left: Normal. Hearing is jennifer   Skin Normal Inspection - Normal.        Lymph

## 2018-08-26 ENCOUNTER — HOSPITAL ENCOUNTER (OUTPATIENT)
Age: 48
Discharge: HOME | End: 2018-08-26
Attending: FAMILY MEDICINE
Payer: COMMERCIAL

## 2018-08-26 VITALS
DIASTOLIC BLOOD PRESSURE: 75 MMHG | WEIGHT: 125 LBS | SYSTOLIC BLOOD PRESSURE: 123 MMHG | HEIGHT: 63 IN | RESPIRATION RATE: 18 BRPM | HEART RATE: 65 BPM | OXYGEN SATURATION: 100 % | TEMPERATURE: 98 F | BODY MASS INDEX: 22.15 KG/M2

## 2018-08-26 DIAGNOSIS — R21 RASH: Primary | ICD-10-CM

## 2018-08-26 DIAGNOSIS — L85.3 DRY SKIN: ICD-10-CM

## 2018-08-26 PROCEDURE — 99212 OFFICE O/P EST SF 10 MIN: CPT

## 2018-08-26 PROCEDURE — 99213 OFFICE O/P EST LOW 20 MIN: CPT

## 2018-08-26 NOTE — ED INITIAL ASSESSMENT (HPI)
Patient states having she noticed white bumps on arms and legs this morning after showering. Patient denies itchiness to affected area, denies fever. Patient denies using any new cosmetic products.   Patient states on Friday she got a pedicure and had hot

## 2018-08-26 NOTE — ED PROVIDER NOTES
Patient Seen in: [address]    History   Patient presents with:  Rash Skin Problem (integumentary)    Stated Complaint: Rash     HPI    Pt is a 40 yo with dry skin on her arms and legs after showering today.  No redness a Skin is warm. Bumpy dry skin, no erythema   Psychiatric: She has a normal mood and affect. Her behavior is normal.   Nursing note and vitals reviewed.           ED Course   Labs Reviewed - No data to display    ED Course as of Aug 26 1014  ---------------

## 2018-09-26 ENCOUNTER — OFFICE VISIT (OUTPATIENT)
Dept: INTERNAL MEDICINE CLINIC | Facility: CLINIC | Age: 48
End: 2018-09-26
Payer: COMMERCIAL

## 2018-09-26 VITALS
TEMPERATURE: 99 F | HEART RATE: 72 BPM | WEIGHT: 123 LBS | DIASTOLIC BLOOD PRESSURE: 74 MMHG | BODY MASS INDEX: 21.79 KG/M2 | HEIGHT: 63 IN | SYSTOLIC BLOOD PRESSURE: 114 MMHG

## 2018-09-26 DIAGNOSIS — D75.89 MACROCYTOSIS: ICD-10-CM

## 2018-09-26 DIAGNOSIS — K21.9 GASTROESOPHAGEAL REFLUX DISEASE WITHOUT ESOPHAGITIS: ICD-10-CM

## 2018-09-26 DIAGNOSIS — M75.21 BICEPS TENDINITIS OF RIGHT UPPER EXTREMITY: ICD-10-CM

## 2018-09-26 DIAGNOSIS — R21 FACIAL RASH: Primary | ICD-10-CM

## 2018-09-26 DIAGNOSIS — R79.89 LIVER FUNCTION TEST ABNORMALITY: ICD-10-CM

## 2018-09-26 DIAGNOSIS — K20.0 EOSINOPHILIC ESOPHAGITIS: ICD-10-CM

## 2018-09-26 PROCEDURE — 99214 OFFICE O/P EST MOD 30 MIN: CPT | Performed by: INTERNAL MEDICINE

## 2018-09-26 PROCEDURE — 99212 OFFICE O/P EST SF 10 MIN: CPT | Performed by: INTERNAL MEDICINE

## 2018-09-26 NOTE — PROGRESS NOTES
Frances Singh is a 57year old female. HPI:   Patient presents with:  Derm Problem: Pt has had swelling on her face and itchiness on her chest and arms.    Shoulder Pain: Pt states she has had right shoulder pain and limited range of motion for several Medications (Active prior to today's visit):    Current Outpatient Medications:  Acidophilus/Pectin Oral Cap Take 1 capsule by mouth daily. Disp:  Rfl:    Multiple Vitamins-Minerals (MULTI FOR HER OR) Take 1 tablet by mouth daily.  Disp:  Rfl:        Al lobe pneumonia  In Aug 2017;  The patient failed outpatient treatment with Levaquin.  She had been started on Zithromax 500 mg IV q24hrs, d#2 and ceftriaxone 1 gm IV q24hrs, d#2; s/p  Ceftin 500 mg po BID x8d, and Zithromax 250 mg po qD x4d; F/U CXR 8/28/17 Referrals:  None     9/26/2018  Hayden Campbell MD

## 2018-10-04 ENCOUNTER — OFFICE VISIT (OUTPATIENT)
Dept: OTOLARYNGOLOGY | Facility: CLINIC | Age: 48
End: 2018-10-04
Payer: COMMERCIAL

## 2018-10-04 VITALS
TEMPERATURE: 99 F | DIASTOLIC BLOOD PRESSURE: 77 MMHG | HEIGHT: 63 IN | WEIGHT: 123 LBS | BODY MASS INDEX: 21.79 KG/M2 | SYSTOLIC BLOOD PRESSURE: 111 MMHG

## 2018-10-04 DIAGNOSIS — R59.1 LYMPHADENOPATHY: Primary | ICD-10-CM

## 2018-10-04 PROCEDURE — 99212 OFFICE O/P EST SF 10 MIN: CPT | Performed by: OTOLARYNGOLOGY

## 2018-10-04 PROCEDURE — 99213 OFFICE O/P EST LOW 20 MIN: CPT | Performed by: OTOLARYNGOLOGY

## 2018-10-04 NOTE — PROGRESS NOTES
Tyree Parra is a 57year old female. Patient presents with:  Mass: pt feels a lump in her neck for 5 days, pain when pt moves neck upward     HPI:   She has had a tender bump in the right side of her neck for the last 5 days.   It seems to be painful whe Skull - Normal.   Oral/Oropharynx Normal Lips - Normal, Tonsils - Normal, Tongue - Normal    Nasal Normal External nose - Normal. Nasal septum - Normal, Turbinates - Normal   Neurological Normal Memory - Normal. Cranial nerves - Cranial nerves II through X

## 2019-07-16 ENCOUNTER — OFFICE VISIT (OUTPATIENT)
Dept: OTOLARYNGOLOGY | Facility: CLINIC | Age: 49
End: 2019-07-16
Payer: COMMERCIAL

## 2019-07-16 VITALS — SYSTOLIC BLOOD PRESSURE: 106 MMHG | TEMPERATURE: 98 F | DIASTOLIC BLOOD PRESSURE: 70 MMHG

## 2019-07-16 DIAGNOSIS — J02.9 PHARYNGITIS, UNSPECIFIED ETIOLOGY: Primary | ICD-10-CM

## 2019-07-16 PROCEDURE — 99213 OFFICE O/P EST LOW 20 MIN: CPT | Performed by: OTOLARYNGOLOGY

## 2019-07-16 PROCEDURE — 31575 DIAGNOSTIC LARYNGOSCOPY: CPT | Performed by: OTOLARYNGOLOGY

## 2019-07-17 ENCOUNTER — TELEPHONE (OUTPATIENT)
Dept: OTOLARYNGOLOGY | Facility: CLINIC | Age: 49
End: 2019-07-17

## 2019-07-17 RX ORDER — PREDNISONE 20 MG/1
20 TABLET ORAL ONCE
Status: DISCONTINUED | OUTPATIENT
Start: 2019-07-17 | End: 2019-11-27

## 2019-07-17 RX ORDER — AZITHROMYCIN 250 MG/1
TABLET, FILM COATED ORAL
Qty: 1 PACKAGE | Refills: 0 | Status: SHIPPED | OUTPATIENT
Start: 2019-07-17 | End: 2019-07-22 | Stop reason: ALTCHOICE

## 2019-07-17 RX ORDER — PREDNISONE 20 MG/1
TABLET ORAL
Qty: 10 TABLET | Refills: 0 | Status: SHIPPED | OUTPATIENT
Start: 2019-07-17 | End: 2019-07-22 | Stop reason: ALTCHOICE

## 2019-07-17 NOTE — TELEPHONE ENCOUNTER
Pt was seen in the office yesterday and states she was supposed to be prescribed an antibiotic and prednisone. Pharmacy has not received rx. Pt also asking if after visit summary can be added to MyChart.

## 2019-07-18 NOTE — PROGRESS NOTES
Lesli Camacho is a 57year old female. Patient presents with:   Tonsil Problem: [place] c/o hard to swallow,pt noted tonsil stone on the right side was seen by a doctor and dx with tonsilitis ,patient stated she is not feeling 106/70 (BP Location: Left arm)   Temp 98 °F (36.7 °C) (Tympanic)   System Findings Details   Skin Normal Inspection - Normal.   Constitutional Normal Overall appearance - Normal.   Head/Face Normal Facial features - Normal. Eyebrows - Normal. Skull - Rom Esposito normal.  Pyriform sinus:  Bilateral: Pyriform sinuses are normal.  Base of tongue is normal in appearance. There is no airway obstruction. General comments:     ASSESSMENT AND PLAN:   1.  Pharyngitis, unspecified etiology  She is been experiencing pharyn

## 2019-07-22 ENCOUNTER — APPOINTMENT (OUTPATIENT)
Dept: LAB | Age: 49
End: 2019-07-22
Attending: INTERNAL MEDICINE
Payer: COMMERCIAL

## 2019-07-22 ENCOUNTER — OFFICE VISIT (OUTPATIENT)
Dept: INTERNAL MEDICINE CLINIC | Facility: CLINIC | Age: 49
End: 2019-07-22
Payer: COMMERCIAL

## 2019-07-22 VITALS
HEIGHT: 66 IN | HEART RATE: 66 BPM | WEIGHT: 126 LBS | BODY MASS INDEX: 20.25 KG/M2 | TEMPERATURE: 98 F | SYSTOLIC BLOOD PRESSURE: 110 MMHG | OXYGEN SATURATION: 99 % | DIASTOLIC BLOOD PRESSURE: 70 MMHG

## 2019-07-22 DIAGNOSIS — N30.00 ACUTE CYSTITIS WITHOUT HEMATURIA: Primary | ICD-10-CM

## 2019-07-22 DIAGNOSIS — N28.1 RENAL CYST: ICD-10-CM

## 2019-07-22 DIAGNOSIS — E04.1 THYROID NODULE: ICD-10-CM

## 2019-07-22 LAB
BACTERIA UR QL AUTO: NEGATIVE /HPF
BILIRUB UR QL: NEGATIVE
CLARITY UR: CLEAR
COLOR UR: YELLOW
GLUCOSE UR-MCNC: NEGATIVE MG/DL
KETONES UR-MCNC: NEGATIVE MG/DL
LEUKOCYTE ESTERASE UR QL STRIP.AUTO: NEGATIVE
NITRITE UR QL STRIP.AUTO: NEGATIVE
PH UR: 7 [PH] (ref 5–8)
PROT UR-MCNC: NEGATIVE MG/DL
RBC #/AREA URNS AUTO: 0 /HPF
SP GR UR STRIP: 1.01 (ref 1–1.03)
UROBILINOGEN UR STRIP-ACNC: <2
VIT C UR-MCNC: NEGATIVE MG/DL
WBC #/AREA URNS AUTO: <1 /HPF

## 2019-07-22 PROCEDURE — 99214 OFFICE O/P EST MOD 30 MIN: CPT | Performed by: INTERNAL MEDICINE

## 2019-07-22 PROCEDURE — 99212 OFFICE O/P EST SF 10 MIN: CPT | Performed by: INTERNAL MEDICINE

## 2019-07-22 PROCEDURE — 87086 URINE CULTURE/COLONY COUNT: CPT | Performed by: INTERNAL MEDICINE

## 2019-07-22 PROCEDURE — 81001 URINALYSIS AUTO W/SCOPE: CPT | Performed by: INTERNAL MEDICINE

## 2019-07-22 RX ORDER — AMOXICILLIN 500 MG
CAPSULE ORAL
COMMUNITY
End: 2020-08-10

## 2019-07-22 RX ORDER — CYCLOSPORINE 0.5 MG/ML
EMULSION OPHTHALMIC
Refills: 3 | COMMUNITY
Start: 2019-06-26

## 2019-07-22 NOTE — PROGRESS NOTES
Kajal Morgan is a 57year old female. HPI:   Patient presents with:  Checkup: Pt here today for checkup upon returning from vacation in [facility]. While there, she went to [facility] for c/o frequent urination & previous bladder issues.  The 0.50        Years: 20.00        Pack years: 8        Quit date: 2010        Years since quittin.0      Smokeless tobacco: Never Used    Alcohol use:  Yes      Alcohol/week: 0.8 standard drinks      Types: 1 Glasses of wine per week      Comment: o two left renal cysts measuring 16 x 14 mm in largest dimension     LFT abnormality  Had labs in May 2018 from [facility]; AST 50, ALT 80; repeat LFTs in Sept 2018 normalized; observe     Right bicipital tendinitis  Advised rest, stretching, and ibu to try spironolactone     Menorrhagia  F/U gynecologist Dr Joseph Valles at [facility] history of colonic polyps  Next colonoscopy due in 2018     Screening mammogram  Dense breasts  3D tomogram mammo with U/S was [facility] 2 in July 2018        Cell 77

## 2019-07-24 ENCOUNTER — TELEPHONE (OUTPATIENT)
Dept: INTERNAL MEDICINE CLINIC | Facility: CLINIC | Age: 49
End: 2019-07-24

## 2019-08-29 ENCOUNTER — HOSPITAL ENCOUNTER (OUTPATIENT)
Dept: CT IMAGING | Facility: HOSPITAL | Age: 49
Discharge: HOME | End: 2019-08-29
Attending: UROLOGY
Payer: COMMERCIAL

## 2019-08-29 DIAGNOSIS — R31.9 HEMATURIA, UNSPECIFIED: ICD-10-CM

## 2019-08-29 LAB — CREAT BLD-MCNC: 0.8 MG/DL (ref 0.55–1.02)

## 2019-08-29 PROCEDURE — 74178 CT ABD&PLV WO CNTR FLWD CNTR: CPT | Performed by: UROLOGY

## 2019-08-29 PROCEDURE — 82565 ASSAY OF CREATININE: CPT

## 2019-09-19 ENCOUNTER — OFFICE VISIT (OUTPATIENT)
Dept: INTERNAL MEDICINE CLINIC | Facility: CLINIC | Age: 49
End: 2019-09-19
Payer: COMMERCIAL

## 2019-09-19 VITALS
OXYGEN SATURATION: 99 % | WEIGHT: 122 LBS | HEART RATE: 68 BPM | TEMPERATURE: 98 F | BODY MASS INDEX: 19.61 KG/M2 | DIASTOLIC BLOOD PRESSURE: 90 MMHG | HEIGHT: 66 IN | SYSTOLIC BLOOD PRESSURE: 130 MMHG

## 2019-09-19 DIAGNOSIS — R31.9 HEMATURIA, UNSPECIFIED TYPE: ICD-10-CM

## 2019-09-19 DIAGNOSIS — R53.83 OTHER FATIGUE: Primary | ICD-10-CM

## 2019-09-19 DIAGNOSIS — N92.4 EXCESSIVE BLEEDING IN PREMENOPAUSAL PERIOD: ICD-10-CM

## 2019-09-19 DIAGNOSIS — D64.9 ANEMIA, UNSPECIFIED TYPE: ICD-10-CM

## 2019-09-19 PROCEDURE — 99212 OFFICE O/P EST SF 10 MIN: CPT | Performed by: INTERNAL MEDICINE

## 2019-09-19 PROCEDURE — 99214 OFFICE O/P EST MOD 30 MIN: CPT | Performed by: INTERNAL MEDICINE

## 2019-09-19 RX ORDER — CLINDAMYCIN PHOSPHATE 11.9 MG/ML
SOLUTION TOPICAL
Refills: 3 | COMMUNITY
Start: 2019-08-26 | End: 2020-02-03

## 2019-09-19 NOTE — PROGRESS NOTES
Federico Milian is a 54year old female.     HPI:   Patient presents with:  Altered Mental Status (neurologic): SOB w/ exertion, Sinus pressure, enlarged lymph nodes  x 10 days       53 y/o F with c/o dyspnea on exertion during exercise; +sinus and nasal c Cap Take by mouth. Disp:  Rfl:    Multiple Vitamins-Minerals (MULTI FOR HER OR) Take 1 tablet by mouth daily.  Disp:  Rfl:    Clindamycin Phosphate 1 % External Solution APPLY TO SKIN D UTD Disp:  Rfl: 3       Allergies:    Penicillins             NAUSEA AN dimension     LFT abnormality  Had labs in May 2018 from [facility]; AST 50, ALT 80; repeat LFTs in Sept 2018 normalized; observe     Right bicipital tendinitis  Advised rest, stretching, and ibuprofen prn     Chest discomfort  Suspect due to +hear gynecologist Dr Rona Maldonado at [place]     Family history of colonic polyps  Next colonoscopy due in 2018     Screening mammogram  Dense breasts  3D tomogram mammo with U/S was [facility] 2 in July 2018        Cell [phone]                Orders This Visit

## 2019-09-22 ENCOUNTER — APPOINTMENT (OUTPATIENT)
Dept: LAB | Facility: HOSPITAL | Age: 49
End: 2019-09-22
Attending: INTERNAL MEDICINE
Payer: COMMERCIAL

## 2019-09-22 LAB
ALBUMIN SERPL-MCNC: 3.5 G/DL (ref 3.4–5)
ALBUMIN/GLOB SERPL: 1 {RATIO} (ref 1–2)
ALP LIVER SERPL-CCNC: 59 U/L (ref 39–100)
ALT SERPL-CCNC: 17 U/L (ref 13–56)
ANION GAP SERPL CALC-SCNC: 5 MMOL/L (ref 0–18)
AST SERPL-CCNC: 12 U/L (ref 15–37)
BASOPHILS # BLD AUTO: 0.04 X10(3) UL (ref 0–0.2)
BASOPHILS NFR BLD AUTO: 0.4 %
BILIRUB SERPL-MCNC: 0.3 MG/DL (ref 0.1–2)
BUN BLD-MCNC: 14 MG/DL (ref 7–18)
BUN/CREAT SERPL: 11.5 (ref 10–20)
CALCIUM BLD-MCNC: 8.8 MG/DL (ref 8.5–10.1)
CHLORIDE SERPL-SCNC: 104 MMOL/L (ref 98–112)
CO2 SERPL-SCNC: 26 MMOL/L (ref 21–32)
CREAT BLD-MCNC: 1.22 MG/DL (ref 0.55–1.02)
DEPRECATED RDW RBC AUTO: 44.2 FL (ref 35.1–46.3)
EOSINOPHIL # BLD AUTO: 0.22 X10(3) UL (ref 0–0.7)
EOSINOPHIL NFR BLD AUTO: 2.5 %
ERYTHROCYTE [DISTWIDTH] IN BLOOD BY AUTOMATED COUNT: 11.7 % (ref 11–15)
GLOBULIN PLAS-MCNC: 3.6 G/DL (ref 2.8–4.4)
GLUCOSE BLD-MCNC: 73 MG/DL (ref 70–99)
HCT VFR BLD AUTO: 37.3 % (ref 35–48)
HGB BLD-MCNC: 12.9 G/DL (ref 12–16)
IMM GRANULOCYTES # BLD AUTO: 0.03 X10(3) UL (ref 0–1)
IMM GRANULOCYTES NFR BLD: 0.3 %
LYMPHOCYTES # BLD AUTO: 1.72 X10(3) UL (ref 1–4)
LYMPHOCYTES NFR BLD AUTO: 19.3 %
M PROTEIN MFR SERPL ELPH: 7.1 G/DL (ref 6.4–8.2)
MCH RBC QN AUTO: 35.4 PG (ref 26–34)
MCHC RBC AUTO-ENTMCNC: 34.6 G/DL (ref 31–37)
MCV RBC AUTO: 102.5 FL (ref 80–100)
MONOCYTES # BLD AUTO: 0.75 X10(3) UL (ref 0.1–1)
MONOCYTES NFR BLD AUTO: 8.4 %
NEUTROPHILS # BLD AUTO: 6.16 X10 (3) UL (ref 1.5–7.7)
NEUTROPHILS # BLD AUTO: 6.16 X10(3) UL (ref 1.5–7.7)
NEUTROPHILS NFR BLD AUTO: 69.1 %
OSMOLALITY SERPL CALC.SUM OF ELEC: 279 MOSM/KG (ref 275–295)
PATIENT FASTING: NO
PLATELET # BLD AUTO: 195 10(3)UL (ref 150–450)
POTASSIUM SERPL-SCNC: 3.6 MMOL/L (ref 3.5–5.1)
RBC # BLD AUTO: 3.64 X10(6)UL (ref 3.8–5.3)
SODIUM SERPL-SCNC: 135 MMOL/L (ref 136–145)
WBC # BLD AUTO: 8.9 X10(3) UL (ref 4–11)

## 2019-09-22 PROCEDURE — 80053 COMPREHEN METABOLIC PANEL: CPT | Performed by: INTERNAL MEDICINE

## 2019-09-22 PROCEDURE — 36415 COLL VENOUS BLD VENIPUNCTURE: CPT | Performed by: INTERNAL MEDICINE

## 2019-09-22 PROCEDURE — 85025 COMPLETE CBC W/AUTO DIFF WBC: CPT | Performed by: INTERNAL MEDICINE

## 2019-10-01 ENCOUNTER — HOSPITAL ENCOUNTER (OUTPATIENT)
Dept: CT IMAGING | Age: 49
Discharge: HOME | End: 2019-10-01
Attending: INTERNAL MEDICINE

## 2019-10-01 DIAGNOSIS — Z13.6 SCREENING FOR CARDIOVASCULAR CONDITION: ICD-10-CM

## 2019-10-01 NOTE — PROGRESS NOTES
Pt seen at [facility] for CTHS:    PRELIMINARY SCORE= 0.0    BP= 115/70    Cholestec labs as follows: Fasting    TC= 157    HDL= 65    LDL= N/A    TG= <45    GLUCOSE= 81    All results and risk factors discussed with patient; all questions and concerns addres

## 2019-10-07 ENCOUNTER — TELEPHONE (OUTPATIENT)
Dept: INTERNAL MEDICINE CLINIC | Facility: CLINIC | Age: 49
End: 2019-10-07

## 2019-10-07 DIAGNOSIS — N18.2 STAGE 2 CHRONIC KIDNEY DISEASE: Primary | ICD-10-CM

## 2019-10-07 NOTE — TELEPHONE ENCOUNTER
Pt had a heart scan at the hospital asking if DR. ALVAREZ can review and call her she had a question regarding one of the findings.

## 2019-10-25 ENCOUNTER — HOSPITAL ENCOUNTER (OUTPATIENT)
Dept: ULTRASOUND IMAGING | Age: 49
Discharge: HOME | End: 2019-10-25
Attending: INTERNAL MEDICINE

## 2019-10-25 DIAGNOSIS — Z13.6 SCREENING FOR HEART DISEASE: ICD-10-CM

## 2019-10-25 NOTE — PROGRESS NOTES
Pt seen at [facility] Stroke Screening tests:  PRELIMINARY results as follows:     Carotid US= Normal right and left  Abdominal Aorta US= Normal, No AAA     BP= 115/70  Cholestec labs as follows:from 10/1/19  TC= 157  HDL= 65  LDL= N/A  TG

## 2019-11-04 DIAGNOSIS — I87.1 NUTCRACKER PHENOMENON OF RENAL VEIN: Primary | ICD-10-CM

## 2019-11-04 NOTE — PLAN OF CARE
· You are scheduled to have Renal Venogram  · Do NOT eat or drink anything after Midnight  · Medications you are allowed to take can be taken with a sip of water. Use Betasept/ Hibiclens soap for 3 consecutive days.        ARRIVAL:  · Please make sure to

## 2019-11-06 ENCOUNTER — HOSPITAL ENCOUNTER (OUTPATIENT)
Dept: INTERVENTIONAL RADIOLOGY/VASCULAR | Facility: HOSPITAL | Age: 49
Discharge: HOME | End: 2019-11-06
Attending: RADIOLOGY | Admitting: RADIOLOGY
Payer: COMMERCIAL

## 2019-11-06 VITALS
HEART RATE: 59 BPM | SYSTOLIC BLOOD PRESSURE: 115 MMHG | DIASTOLIC BLOOD PRESSURE: 72 MMHG | RESPIRATION RATE: 15 BRPM | OXYGEN SATURATION: 98 % | WEIGHT: 123 LBS | BODY MASS INDEX: 20 KG/M2

## 2019-11-06 DIAGNOSIS — I87.1 NUTCRACKER PHENOMENON OF RENAL VEIN: ICD-10-CM

## 2019-11-06 PROCEDURE — 36415 COLL VENOUS BLD VENIPUNCTURE: CPT

## 2019-11-06 PROCEDURE — B447ZZ3 ULTRASONOGRAPHY OF LEFT RENAL ARTERY, INTRAVASCULAR: ICD-10-PCS | Performed by: RADIOLOGY

## 2019-11-06 PROCEDURE — 81025 URINE PREGNANCY TEST: CPT | Performed by: RADIOLOGY

## 2019-11-06 PROCEDURE — 37252 INTRVASC US NONCORONARY 1ST: CPT

## 2019-11-06 PROCEDURE — 36011 PLACE CATHETER IN VEIN: CPT

## 2019-11-06 PROCEDURE — 99152 MOD SED SAME PHYS/QHP 5/>YRS: CPT

## 2019-11-06 PROCEDURE — B51K1ZZ FLUOROSCOPY OF LEFT RENAL VEIN USING LOW OSMOLAR CONTRAST: ICD-10-PCS | Performed by: RADIOLOGY

## 2019-11-06 PROCEDURE — 75831 VEIN X-RAY KIDNEY: CPT

## 2019-11-06 RX ORDER — LIDOCAINE HYDROCHLORIDE 20 MG/ML
INJECTION, SOLUTION EPIDURAL; INFILTRATION; INTRACAUDAL; PERINEURAL
Status: COMPLETED
Start: 2019-11-06 | End: 2019-11-06

## 2019-11-06 RX ORDER — SODIUM CHLORIDE 9 MG/ML
INJECTION, SOLUTION INTRAVENOUS
Status: COMPLETED
Start: 2019-11-06 | End: 2019-11-06

## 2019-11-06 RX ORDER — SODIUM CHLORIDE 9 MG/ML
INJECTION, SOLUTION INTRAVENOUS CONTINUOUS
Status: DISCONTINUED | OUTPATIENT
Start: 2019-11-06 | End: 2019-11-06

## 2019-11-06 RX ORDER — MIDAZOLAM HYDROCHLORIDE 1 MG/ML
INJECTION INTRAMUSCULAR; INTRAVENOUS
Status: DISCONTINUED
Start: 2019-11-06 | End: 2019-11-06 | Stop reason: WASHOUT

## 2019-11-06 RX ORDER — ONDANSETRON 2 MG/ML
INJECTION INTRAMUSCULAR; INTRAVENOUS
Status: DISCONTINUED
Start: 2019-11-06 | End: 2019-11-06 | Stop reason: WASHOUT

## 2019-11-06 RX ORDER — MIDAZOLAM HYDROCHLORIDE 1 MG/ML
INJECTION INTRAMUSCULAR; INTRAVENOUS
Status: COMPLETED
Start: 2019-11-06 | End: 2019-11-06

## 2019-11-06 RX ORDER — ACETAMINOPHEN 325 MG/1
TABLET ORAL
Status: COMPLETED
Start: 2019-11-06 | End: 2019-11-06

## 2019-11-06 RX ADMIN — ACETAMINOPHEN: 325 TABLET ORAL at 13:30:00

## 2019-11-06 RX ADMIN — SODIUM CHLORIDE: 9 INJECTION, SOLUTION INTRAVENOUS at 07:33:00

## 2019-11-06 NOTE — INTERVAL H&P NOTE
The above referenced H&P was reviewed by Sammi Osborne MD on 11/6/2019, the patient was examined and no significant changes have occurred in the patient's condition since the H&P was performed.   Risks, benefits, alternative treatments and consequences of no

## 2019-11-06 NOTE — BRIEF PROCEDURE NOTE
[facility]  Procedure Note    [facility] Patient Status:  Outpatient in a Bed    1970 MRN [id number]   Location [facility] Attending Emily Austin MD   [facility] Day # 0 PCP Jozef Irizarry MD

## 2019-11-06 NOTE — IVS NOTE
Pt was able to eat and drink, no nausea or vomiting. Pt ambulated post procedure, site remained soft, no hematoma.   Discharge instructions given

## 2019-11-30 ENCOUNTER — APPOINTMENT (OUTPATIENT)
Dept: LAB | Facility: HOSPITAL | Age: 49
End: 2019-11-30
Attending: SURGERY
Payer: COMMERCIAL

## 2019-11-30 ENCOUNTER — LAB ENCOUNTER (OUTPATIENT)
Dept: LAB | Facility: HOSPITAL | Age: 49
End: 2019-11-30
Attending: SURGERY
Payer: COMMERCIAL

## 2019-11-30 DIAGNOSIS — Z01.818 PRE-OP TESTING: ICD-10-CM

## 2019-11-30 PROCEDURE — 86850 RBC ANTIBODY SCREEN: CPT

## 2019-11-30 PROCEDURE — 80048 BASIC METABOLIC PNL TOTAL CA: CPT

## 2019-11-30 PROCEDURE — 93010 ELECTROCARDIOGRAM REPORT: CPT | Performed by: SURGERY

## 2019-11-30 PROCEDURE — 93005 ELECTROCARDIOGRAM TRACING: CPT

## 2019-11-30 PROCEDURE — 85610 PROTHROMBIN TIME: CPT

## 2019-11-30 PROCEDURE — 85025 COMPLETE CBC W/AUTO DIFF WBC: CPT

## 2019-11-30 PROCEDURE — 87641 MR-STAPH DNA AMP PROBE: CPT

## 2019-11-30 PROCEDURE — 36415 COLL VENOUS BLD VENIPUNCTURE: CPT

## 2019-11-30 PROCEDURE — 86901 BLOOD TYPING SEROLOGIC RH(D): CPT

## 2019-11-30 PROCEDURE — 86900 BLOOD TYPING SEROLOGIC ABO: CPT

## 2019-11-30 PROCEDURE — 85730 THROMBOPLASTIN TIME PARTIAL: CPT

## 2019-12-04 ENCOUNTER — HOSPITAL ENCOUNTER (INPATIENT)
Facility: HOSPITAL | Age: 49
LOS: 3 days | Discharge: HOME | DRG: 254 | End: 2019-12-07
Attending: SURGERY | Admitting: SURGERY
Payer: COMMERCIAL

## 2019-12-04 ENCOUNTER — ANESTHESIA (OUTPATIENT)
Dept: SURGERY | Facility: HOSPITAL | Age: 49
DRG: 254 | End: 2019-12-04
Payer: COMMERCIAL

## 2019-12-04 ENCOUNTER — ANESTHESIA EVENT (OUTPATIENT)
Dept: SURGERY | Facility: HOSPITAL | Age: 49
DRG: 254 | End: 2019-12-04
Payer: COMMERCIAL

## 2019-12-04 DIAGNOSIS — Z01.818 PRE-OP TESTING: Primary | ICD-10-CM

## 2019-12-04 PROBLEM — I87.1 NUTCRACKER PHENOMENON OF RENAL VEIN: Status: ACTIVE | Noted: 2019-12-04

## 2019-12-04 PROCEDURE — 3E0T3BZ INTRODUCTION OF ANESTHETIC AGENT INTO PERIPHERAL NERVES AND PLEXI, PERCUTANEOUS APPROACH: ICD-10-PCS | Performed by: SURGERY

## 2019-12-04 PROCEDURE — 04SA0ZZ REPOSITION LEFT RENAL ARTERY, OPEN APPROACH: ICD-10-PCS | Performed by: SURGERY

## 2019-12-04 RX ORDER — PROTAMINE SULFATE 10 MG/ML
INJECTION, SOLUTION INTRAVENOUS AS NEEDED
Status: DISCONTINUED | OUTPATIENT
Start: 2019-12-04 | End: 2019-12-04 | Stop reason: SURG

## 2019-12-04 RX ORDER — NEOSTIGMINE METHYLSULFATE 0.5 MG/ML
INJECTION INTRAVENOUS AS NEEDED
Status: DISCONTINUED | OUTPATIENT
Start: 2019-12-04 | End: 2019-12-04 | Stop reason: SURG

## 2019-12-04 RX ORDER — ONDANSETRON 2 MG/ML
4 INJECTION INTRAMUSCULAR; INTRAVENOUS ONCE AS NEEDED
Status: DISCONTINUED | OUTPATIENT
Start: 2019-12-04 | End: 2019-12-04 | Stop reason: HOSPADM

## 2019-12-04 RX ORDER — HALOPERIDOL 5 MG/ML
0.25 INJECTION INTRAMUSCULAR ONCE AS NEEDED
Status: DISCONTINUED | OUTPATIENT
Start: 2019-12-04 | End: 2019-12-04 | Stop reason: HOSPADM

## 2019-12-04 RX ORDER — HYDROCODONE BITARTRATE AND ACETAMINOPHEN 5; 325 MG/1; MG/1
2 TABLET ORAL AS NEEDED
Status: DISCONTINUED | OUTPATIENT
Start: 2019-12-04 | End: 2019-12-04 | Stop reason: HOSPADM

## 2019-12-04 RX ORDER — BUPIVACAINE HYDROCHLORIDE 2.5 MG/ML
INJECTION, SOLUTION EPIDURAL; INFILTRATION; INTRACAUDAL AS NEEDED
Status: DISCONTINUED | OUTPATIENT
Start: 2019-12-04 | End: 2019-12-04 | Stop reason: SURG

## 2019-12-04 RX ORDER — HYDROCODONE BITARTRATE AND ACETAMINOPHEN 5; 325 MG/1; MG/1
1 TABLET ORAL AS NEEDED
Status: DISCONTINUED | OUTPATIENT
Start: 2019-12-04 | End: 2019-12-04 | Stop reason: HOSPADM

## 2019-12-04 RX ORDER — SODIUM CHLORIDE, SODIUM LACTATE, POTASSIUM CHLORIDE, CALCIUM CHLORIDE 600; 310; 30; 20 MG/100ML; MG/100ML; MG/100ML; MG/100ML
INJECTION, SOLUTION INTRAVENOUS CONTINUOUS
Status: DISCONTINUED | OUTPATIENT
Start: 2019-12-04 | End: 2019-12-04 | Stop reason: HOSPADM

## 2019-12-04 RX ORDER — CEFAZOLIN SODIUM/WATER 2 G/20 ML
2 SYRINGE (ML) INTRAVENOUS ONCE
Status: COMPLETED | OUTPATIENT
Start: 2019-12-04 | End: 2019-12-04

## 2019-12-04 RX ORDER — MORPHINE SULFATE 4 MG/ML
2 INJECTION, SOLUTION INTRAMUSCULAR; INTRAVENOUS
Status: DISCONTINUED | OUTPATIENT
Start: 2019-12-04 | End: 2019-12-07

## 2019-12-04 RX ORDER — DEXTROSE AND SODIUM CHLORIDE 5; .45 G/100ML; G/100ML
INJECTION, SOLUTION INTRAVENOUS CONTINUOUS
Status: DISCONTINUED | OUTPATIENT
Start: 2019-12-04 | End: 2019-12-07

## 2019-12-04 RX ORDER — MORPHINE SULFATE 4 MG/ML
2 INJECTION, SOLUTION INTRAMUSCULAR; INTRAVENOUS EVERY 10 MIN PRN
Status: DISCONTINUED | OUTPATIENT
Start: 2019-12-04 | End: 2019-12-04 | Stop reason: HOSPADM

## 2019-12-04 RX ORDER — MORPHINE SULFATE 4 MG/ML
8 INJECTION, SOLUTION INTRAMUSCULAR; INTRAVENOUS
Status: DISCONTINUED | OUTPATIENT
Start: 2019-12-04 | End: 2019-12-07

## 2019-12-04 RX ORDER — ONDANSETRON 2 MG/ML
4 INJECTION INTRAMUSCULAR; INTRAVENOUS EVERY 6 HOURS PRN
Status: DISCONTINUED | OUTPATIENT
Start: 2019-12-04 | End: 2019-12-07

## 2019-12-04 RX ORDER — GLYCOPYRROLATE 0.2 MG/ML
INJECTION INTRAMUSCULAR; INTRAVENOUS AS NEEDED
Status: DISCONTINUED | OUTPATIENT
Start: 2019-12-04 | End: 2019-12-04 | Stop reason: SURG

## 2019-12-04 RX ORDER — ONDANSETRON 2 MG/ML
INJECTION INTRAMUSCULAR; INTRAVENOUS AS NEEDED
Status: DISCONTINUED | OUTPATIENT
Start: 2019-12-04 | End: 2019-12-04 | Stop reason: SURG

## 2019-12-04 RX ORDER — MAGNESIUM HYDROXIDE 1200 MG/15ML
LIQUID ORAL CONTINUOUS PRN
Status: DISCONTINUED | OUTPATIENT
Start: 2019-12-04 | End: 2019-12-04 | Stop reason: HOSPADM

## 2019-12-04 RX ORDER — FAMOTIDINE 20 MG/1
20 TABLET ORAL ONCE
Status: DISCONTINUED | OUTPATIENT
Start: 2019-12-04 | End: 2019-12-04 | Stop reason: HOSPADM

## 2019-12-04 RX ORDER — NALOXONE HYDROCHLORIDE 0.4 MG/ML
80 INJECTION, SOLUTION INTRAMUSCULAR; INTRAVENOUS; SUBCUTANEOUS AS NEEDED
Status: DISCONTINUED | OUTPATIENT
Start: 2019-12-04 | End: 2019-12-04 | Stop reason: HOSPADM

## 2019-12-04 RX ORDER — METOCLOPRAMIDE 10 MG/1
10 TABLET ORAL ONCE
Status: DISCONTINUED | OUTPATIENT
Start: 2019-12-04 | End: 2019-12-04 | Stop reason: HOSPADM

## 2019-12-04 RX ORDER — DEXAMETHASONE SODIUM PHOSPHATE 4 MG/ML
VIAL (ML) INJECTION AS NEEDED
Status: DISCONTINUED | OUTPATIENT
Start: 2019-12-04 | End: 2019-12-04 | Stop reason: SURG

## 2019-12-04 RX ORDER — HYDROMORPHONE HYDROCHLORIDE 1 MG/ML
0.2 INJECTION, SOLUTION INTRAMUSCULAR; INTRAVENOUS; SUBCUTANEOUS EVERY 5 MIN PRN
Status: DISCONTINUED | OUTPATIENT
Start: 2019-12-04 | End: 2019-12-04 | Stop reason: HOSPADM

## 2019-12-04 RX ORDER — ACETAMINOPHEN 500 MG
1000 TABLET ORAL ONCE
Status: COMPLETED | OUTPATIENT
Start: 2019-12-04 | End: 2019-12-04

## 2019-12-04 RX ORDER — MORPHINE SULFATE 4 MG/ML
4 INJECTION, SOLUTION INTRAMUSCULAR; INTRAVENOUS
Status: DISCONTINUED | OUTPATIENT
Start: 2019-12-04 | End: 2019-12-07

## 2019-12-04 RX ORDER — ENOXAPARIN SODIUM 100 MG/ML
40 INJECTION SUBCUTANEOUS DAILY
Status: DISCONTINUED | OUTPATIENT
Start: 2019-12-04 | End: 2019-12-07

## 2019-12-04 RX ORDER — MORPHINE SULFATE 4 MG/ML
4 INJECTION, SOLUTION INTRAMUSCULAR; INTRAVENOUS EVERY 10 MIN PRN
Status: DISCONTINUED | OUTPATIENT
Start: 2019-12-04 | End: 2019-12-04 | Stop reason: HOSPADM

## 2019-12-04 RX ORDER — SODIUM CHLORIDE, SODIUM LACTATE, POTASSIUM CHLORIDE, CALCIUM CHLORIDE 600; 310; 30; 20 MG/100ML; MG/100ML; MG/100ML; MG/100ML
INJECTION, SOLUTION INTRAVENOUS CONTINUOUS
Status: DISCONTINUED | OUTPATIENT
Start: 2019-12-04 | End: 2019-12-07

## 2019-12-04 RX ORDER — CEFAZOLIN SODIUM/WATER 2 G/20 ML
2 SYRINGE (ML) INTRAVENOUS EVERY 8 HOURS
Status: COMPLETED | OUTPATIENT
Start: 2019-12-04 | End: 2019-12-05

## 2019-12-04 RX ORDER — MIDAZOLAM HYDROCHLORIDE 1 MG/ML
INJECTION INTRAMUSCULAR; INTRAVENOUS AS NEEDED
Status: DISCONTINUED | OUTPATIENT
Start: 2019-12-04 | End: 2019-12-04 | Stop reason: SURG

## 2019-12-04 RX ORDER — HYDROMORPHONE HYDROCHLORIDE 1 MG/ML
0.4 INJECTION, SOLUTION INTRAMUSCULAR; INTRAVENOUS; SUBCUTANEOUS EVERY 5 MIN PRN
Status: DISCONTINUED | OUTPATIENT
Start: 2019-12-04 | End: 2019-12-04 | Stop reason: HOSPADM

## 2019-12-04 RX ORDER — HYDROMORPHONE HYDROCHLORIDE 1 MG/ML
0.6 INJECTION, SOLUTION INTRAMUSCULAR; INTRAVENOUS; SUBCUTANEOUS EVERY 5 MIN PRN
Status: DISCONTINUED | OUTPATIENT
Start: 2019-12-04 | End: 2019-12-04 | Stop reason: HOSPADM

## 2019-12-04 RX ORDER — HEPARIN SODIUM 1000 [USP'U]/ML
INJECTION, SOLUTION INTRAVENOUS; SUBCUTANEOUS AS NEEDED
Status: DISCONTINUED | OUTPATIENT
Start: 2019-12-04 | End: 2019-12-04 | Stop reason: SURG

## 2019-12-04 RX ORDER — MORPHINE SULFATE 10 MG/ML
6 INJECTION, SOLUTION INTRAMUSCULAR; INTRAVENOUS EVERY 10 MIN PRN
Status: DISCONTINUED | OUTPATIENT
Start: 2019-12-04 | End: 2019-12-04 | Stop reason: HOSPADM

## 2019-12-04 RX ORDER — PROCHLORPERAZINE EDISYLATE 5 MG/ML
5 INJECTION INTRAMUSCULAR; INTRAVENOUS ONCE AS NEEDED
Status: DISCONTINUED | OUTPATIENT
Start: 2019-12-04 | End: 2019-12-04 | Stop reason: HOSPADM

## 2019-12-04 RX ORDER — ROCURONIUM BROMIDE 10 MG/ML
INJECTION, SOLUTION INTRAVENOUS AS NEEDED
Status: DISCONTINUED | OUTPATIENT
Start: 2019-12-04 | End: 2019-12-04 | Stop reason: SURG

## 2019-12-04 RX ADMIN — HEPARIN SODIUM 5000 UNITS: 1000 INJECTION, SOLUTION INTRAVENOUS; SUBCUTANEOUS at 09:20:00

## 2019-12-04 RX ADMIN — BUPIVACAINE HYDROCHLORIDE 10 ML: 2.5 INJECTION, SOLUTION EPIDURAL; INFILTRATION; INTRACAUDAL at 11:13:00

## 2019-12-04 RX ADMIN — BUPIVACAINE HYDROCHLORIDE 10 ML: 2.5 INJECTION, SOLUTION EPIDURAL; INFILTRATION; INTRACAUDAL at 11:14:00

## 2019-12-04 RX ADMIN — ROCURONIUM BROMIDE 50 MG: 10 INJECTION, SOLUTION INTRAVENOUS at 07:46:00

## 2019-12-04 RX ADMIN — ROCURONIUM BROMIDE 10 MG: 10 INJECTION, SOLUTION INTRAVENOUS at 08:33:00

## 2019-12-04 RX ADMIN — NEOSTIGMINE METHYLSULFATE 3 MG: 0.5 INJECTION INTRAVENOUS at 10:23:00

## 2019-12-04 RX ADMIN — CEFAZOLIN SODIUM/WATER 2 G: 2 G/20 ML SYRINGE (ML) INTRAVENOUS at 08:00:00

## 2019-12-04 RX ADMIN — BUPIVACAINE HYDROCHLORIDE 10 ML: 2.5 INJECTION, SOLUTION EPIDURAL; INFILTRATION; INTRACAUDAL at 11:05:00

## 2019-12-04 RX ADMIN — ONDANSETRON 4 MG: 2 INJECTION INTRAMUSCULAR; INTRAVENOUS at 10:23:00

## 2019-12-04 RX ADMIN — GLYCOPYRROLATE 0.4 MG: 0.2 INJECTION INTRAMUSCULAR; INTRAVENOUS at 10:23:00

## 2019-12-04 RX ADMIN — HEPARIN SODIUM 2000 UNITS: 1000 INJECTION, SOLUTION INTRAVENOUS; SUBCUTANEOUS at 09:33:00

## 2019-12-04 RX ADMIN — BUPIVACAINE HYDROCHLORIDE 10 ML: 2.5 INJECTION, SOLUTION EPIDURAL; INFILTRATION; INTRACAUDAL at 11:07:00

## 2019-12-04 RX ADMIN — PROTAMINE SULFATE 40 MG: 10 INJECTION, SOLUTION INTRAVENOUS at 10:05:00

## 2019-12-04 RX ADMIN — MIDAZOLAM HYDROCHLORIDE 2 MG: 1 INJECTION INTRAMUSCULAR; INTRAVENOUS at 07:41:00

## 2019-12-04 RX ADMIN — DEXAMETHASONE SODIUM PHOSPHATE 4 MG: 4 MG/ML VIAL (ML) INJECTION at 07:50:00

## 2019-12-04 RX ADMIN — ROCURONIUM BROMIDE 10 MG: 10 INJECTION, SOLUTION INTRAVENOUS at 10:06:00

## 2019-12-04 RX ADMIN — ROCURONIUM BROMIDE 10 MG: 10 INJECTION, SOLUTION INTRAVENOUS at 09:48:00

## 2019-12-04 RX ADMIN — SODIUM CHLORIDE, SODIUM LACTATE, POTASSIUM CHLORIDE, CALCIUM CHLORIDE: 600; 310; 30; 20 INJECTION, SOLUTION INTRAVENOUS at 10:52:00

## 2019-12-04 NOTE — ANESTHESIA PROCEDURE NOTES
Arterial Line  Performed by: Bronson Mejia MD  Authorized by: Bronson Mejia MD     General Information and Staff    Procedure Start:   Anesthesiologist: Bronson Mejia MD  Performed By:  Anesthesiologist  Patient Location:  OR  Indication: continuous b

## 2019-12-04 NOTE — ANESTHESIA PROCEDURE NOTES
Peripheral Block  Performed by: Camron Dumont MD  Authorized by: Camron Dumont MD       General Information and Staff    Start Time:  12/4/2019 11:04 AM  End Time:  12/4/2019 11:14 AM  Anesthesiologist:  Camron Dumont MD  Performed by:   Anesthesiologist

## 2019-12-04 NOTE — ANESTHESIA PROCEDURE NOTES
Airway  Urgency: Elective      General Information and Staff    Patient location during procedure: OR  Anesthesiologist: Laith Acosta MD  Performed: anesthesiologist     Indications and Patient Condition  Indications for airway management: anesthesia  Se

## 2019-12-04 NOTE — H&P
Minoo Montemayor, [address] [facility]  Vascular and Endovascular Surgery  Wound Care Clinic                    VASCULAR SURGERY   HP NOTE           Name: Mary Vernon   :   1970  [id number]      REFERRING PHYSICIAN:  Oxana Self Referred  JANINE Medications:   •  Clindamycin Phosphate 1 % External Solution, APPLY TO SKIN D UTD, Disp: , Rfl: 3  •  RESTASIS 0.05 % Ophthalmic Emulsion, INSTILL 1 DROP INTO BOTH EYES BID., Disp: , Rfl: 3  •  Omega-3 Fatty Acids (FISH OIL) 1200 MG Oral Cap, Take by mout vein     I had a prolonged discussion with the patient and her  where I went over the anatomical explanation for her left renal vein compression with a resultant venous congestion of the pelvis.   I explained to her that this entity can sometimes cau

## 2019-12-04 NOTE — BRIEF OP NOTE
Patients Name: Anna Ireland  Attending Physician: Demario Qiu MD  Operating Physician: Deuqan Hinson MD  CSN: [id number]     Location:  OR  MRN: [id number]    YOB: 1970  Admission Date: 12/4/2019  Operation Date: 12/4/2019    Brief

## 2019-12-04 NOTE — ANESTHESIA POSTPROCEDURE EVALUATION
Patient: Nannette Rodgers    Procedure Summary     Date:  12/04/19 Room / Location:  [facility] OR  / [facility] OR    Anesthesia Start:  2780 Anesthesia Stop:  7278    Procedure:  RENAL ARTERY BYPASS (Left ) Diagnosis:  (nutcracker phenomenon of renal vein)

## 2019-12-04 NOTE — OPERATIVE REPORT
[facility]     PATIENTS NAME: Tisha Davenport  ATTENDING PHYSICIAN: Lori Murray MD  OPERATING PHYSICIAN: Tobi Ozuna MD  CSN: [id number]     LOCATION:  OR  MRN: [id number]    YOB: 1970  ADMISSION DATE: 12/4/2019  OPERATION technique. The patient’s anterior abdomen was prepped and draped in the usual sterile fashion. Preoperative antibiotics were administered prior to skin incision. A vertical skin incision was made from the subxiphoid to above the umbilical region.  The subc running 4-0 Prolene suture. The clamp was released and there was no bleeding. The vena cava was not narrowed more than 10%. Then the Satinsky clamp was then applied about 3 cm caudad to the prior incision.   A #11 blade was then used to perform an anteri

## 2019-12-05 PROCEDURE — 99222 1ST HOSP IP/OBS MODERATE 55: CPT | Performed by: INTERNAL MEDICINE

## 2019-12-05 RX ORDER — ACETAMINOPHEN 500 MG
1000 TABLET ORAL EVERY 8 HOURS PRN
Status: DISCONTINUED | OUTPATIENT
Start: 2019-12-05 | End: 2019-12-07

## 2019-12-05 RX ORDER — PANTOPRAZOLE SODIUM 40 MG/1
40 TABLET, DELAYED RELEASE ORAL
Status: DISCONTINUED | OUTPATIENT
Start: 2019-12-05 | End: 2019-12-07

## 2019-12-05 NOTE — PLAN OF CARE
Problem: Patient/Family Goals  Goal: Patient/Family Long Term Goal  Description  Patient's Long Term Goal: Go home.     Interventions:  - Monitor VS  - Assess for pain  - Ensure comfort    - See additional Care Plan goals for specific interventions   Outc other facility with appropriate resources  Description  INTERVENTIONS:  - Identify barriers to discharge w/pt and caregiver  - Include patient/family/discharge partner in discharge planning  - Arrange for needed discharge resources and transportation as ap bleeding  - Monitor lab trends  - Patient is to report abnormal signs of bleeding to staff  - Avoid use of toothpicks and dental floss  - Use electric shaver for shaving  - Use soft bristle tooth brush  - Limit straining and forceful nose blowing  Outcome: time, pt c/o pain but does not want morphine at the time, dc when cleared medically, arterial line and arauz removal in the morning, all pt needs met.

## 2019-12-05 NOTE — PLAN OF CARE
Problem: Patient/Family Goals  Goal: Patient/Family Long Term Goal  Description  Patient's Long Term Goal: Go home.     Interventions:  - Monitor VS  - Assess for pain  - Ensure comfort    - See additional Care Plan goals for specific interventions   Outc strengthening/mobility  - Encourage toileting schedule  Outcome: Progressing  Bed in low and locked position, call light within reach, hourly rounding, side rails up x 3.       Problem: DISCHARGE PLANNING  Goal: Discharge to home or other facility with appr HEMATOLOGIC - ADULT  Goal: Maintains hematologic stability  Description  INTERVENTIONS  - Assess for signs and symptoms of bleeding or hemorrhage  - Monitor labs and vital signs for trends  - Administer supportive blood products/factors, fluids and medicat with IV or PO as ordered and tolerated  - Nasogastric tube to low intermittent suction as ordered  - Evaluate effectiveness of ordered antiemetic medications  - Provide nonpharmacologic comfort measures as appropriate  - Advance diet as tolerated, if order

## 2019-12-05 NOTE — PLAN OF CARE
Problem: Patient/Family Goals  Goal: Patient/Family Long Term Goal  Description  Patient's Long Term Goal:     Interventions:  -   - See additional Care Plan goals for specific interventions  Outcome: Progressing  Goal: Patient/Family Short Term Goal  Annika Alcantara assist with strengthening/mobility  - Encourage toileting schedule  Outcome: Progressing     Problem: DISCHARGE PLANNING  Goal: Discharge to home or other facility with appropriate resources  Description  INTERVENTIONS:  - Identify barriers to discharge w/ care as needed  Outcome: Progressing  Goal: Incision(s), wounds(s) or drain site(s) healing without S/S of infection  Description  INTERVENTIONS:  - Assess and document risk factors for pressure ulcer development  - Assess and document skin integrity  - As Advance activity as appropriate  - Communicate ordered activity level and limitations with patient/family  Outcome: Progressing  Goal: Maintain proper alignment of affected body part  Description  INTERVENTIONS:  - Support and protect limb and body alignme

## 2019-12-05 NOTE — PLAN OF CARE
Patient A&O X4.  2LNC     Problem: PAIN - ADULT  Goal: Verbalizes/displays adequate comfort level or patient's stated pain goal  Description  INTERVENTIONS:  - Encourage pt to monitor pain and request assistance  - Assess pain using appropriate pain scale and transportation as appropriate  - Identify discharge learning needs (meds, wound care, etc)  - Arrange for interpreters to assist at discharge as needed  - Consider post-discharge preferences of patient/family/discharge partner  - Complete POLST form as Initiate isolation precautions as appropriate  - Initiate Pressure Ulcer prevention bundle as indicated  Outcome: Progressing     Problem: HEMATOLOGIC - ADULT  Goal: Maintains hematologic stability  Description  INTERVENTIONS  - Assess for signs and sympto precautions (e.g. spinal or hip dislocation precautions)  Outcome: Progressing

## 2019-12-05 NOTE — H&P
[facility]    History and Physical    Jamin Cassette Patient Status:  Inpatient    1970 MRN [id number]   Location [facility] 2W/SW Attending Rylan Aguillon MD   Hosp Day # 1 PCP Amna Lutz MD     Date:  2019 2007   • CHOLECYSTECTOMY     • COLONOSCOPY  4/13   • EGD  12/14, 2007   • KNEE SURGERY      2006     Family History   Problem Relation Age of Onset   • Depression Other    • Heart Disease Father    • Prostate Cancer Father    • Heart Disease Paternal [place] (L) 09/22/2019    ALT 17 09/22/2019    PTT 27.9 11/30/2019    INR 0.96 11/30/2019    MG 2.1 12/13/2016    B12 395 08/16/2017               Assessment/Plan:       Nutcracker phenomenon of renal vein  POD #1 s/p left renal vein transposition.   Pt notes moder

## 2019-12-05 NOTE — PROGRESS NOTES
[facility]     Vascular Surgery Progress Note    Adele Doherty Patient Status:  Inpatient    1970 MRN [id number]   Location [facility] 2W/SW Attending Mitch Renteria MD   Hosp Day # 1 PCP MD Valeriy Goss .3* 101.6* 102.7*   .0 180.0 120.0*   INR 0.96  --   --        Recent Labs   Lab 11/30/19  1024 12/04/19  1130 12/05/19  0947    142 139   K 3.8 4.3 3.9    114* 112   CO2 27.0 22.0 20.0*   BUN 11 13 14   CREATSERUM 0.72 0.93 1.1

## 2019-12-06 ENCOUNTER — APPOINTMENT (OUTPATIENT)
Dept: CT IMAGING | Facility: HOSPITAL | Age: 49
DRG: 254 | End: 2019-12-06
Attending: SURGERY
Payer: COMMERCIAL

## 2019-12-06 PROCEDURE — 99233 SBSQ HOSP IP/OBS HIGH 50: CPT | Performed by: INTERNAL MEDICINE

## 2019-12-06 PROCEDURE — 74177 CT ABD & PELVIS W/CONTRAST: CPT | Performed by: SURGERY

## 2019-12-06 RX ORDER — IBUPROFEN 200 MG
400 TABLET ORAL EVERY 6 HOURS PRN
Status: DISCONTINUED | OUTPATIENT
Start: 2019-12-06 | End: 2019-12-06

## 2019-12-06 RX ORDER — KETOROLAC TROMETHAMINE 15 MG/ML
15 INJECTION, SOLUTION INTRAMUSCULAR; INTRAVENOUS EVERY 6 HOURS PRN
Status: DISCONTINUED | OUTPATIENT
Start: 2019-12-06 | End: 2019-12-07

## 2019-12-06 RX ORDER — HYDROCODONE BITARTRATE AND ACETAMINOPHEN 5; 325 MG/1; MG/1
1 TABLET ORAL EVERY 6 HOURS PRN
Status: DISCONTINUED | OUTPATIENT
Start: 2019-12-06 | End: 2019-12-07

## 2019-12-06 NOTE — PLAN OF CARE
Patient up with standby assistance and a walker. She was able to walk the entire unit, and sat up in the chair to eat in the afternoon. Pain managed with PRN IV Toradol this afternoon. Walsh catheter remover per order at 15:30.  Patient denies any nausea or - FALL  Goal: Free from fall injury  Description  INTERVENTIONS:  - Assess pt frequently for physical needs  - Identify cognitive and physical deficits and behaviors that affect risk of falls. - Henley fall precautions as indicated by assessment.   - Ed patient to ask for assistance (call light)  - Provide an  as needed  - Communicate barriers and strategies to overcome with those who interact with patient  Outcome: Progressing     Problem: SKIN/TISSUE INTEGRITY - ADULT  Goal: Skin integrity re tooth brush  - Limit straining and forceful nose blowing  Outcome: Progressing     Problem: MUSCULOSKELETAL - ADULT  Goal: Return mobility to safest level of function  Description  INTERVENTIONS:  - Assess patient stability and activity tolerance for stand venous puncture sites for bleeding and/or hematoma  - Assess quality of pulses, skin color and temperature  - Assess for signs of decreased coronary artery perfusion - ex.  Angina  - Evaluate fluid balance, assess for edema, trend weights  Outcome: Progress

## 2019-12-06 NOTE — PROGRESS NOTES
[facility]    Progress Note    Didier Yehuda [place] Patient Status:  Inpatient    1970 MRN [id number]   Location [facility] 2W/SW Attending Sameer Ray MD   Hosp Day # 2 PCP Adrianne Holcomb MD         Assessment and Pl Wt Readings from Last 3 Encounters:  12/04/19 : 122 lb (55.3 kg)  11/05/19 : 123 lb (55.8 kg)  10/17/19 : 123 lb (55.8 kg)        Constitutional: alert and oriented x3 in no acute distress  HEENT- EOMI, PERRL  Nose/Mouth/Throat: pharynx without eryth WBCUR, RBCUR, Chacorta Chain in the last 168 hours. No results for input(s): URINE, CULTI, BLDSMR in the last 168 hours. Imaging:  Ct Abdomen+pelvis(contrast Only)(cpt=74177)    Result Date: 12/6/2019  CONCLUSION:  1.  Postoperative changes of a recen

## 2019-12-06 NOTE — PLAN OF CARE
Problem: Patient/Family Goals  Goal: Patient/Family Long Term Goal  Description  Patient's Long Term Goal: Go home.     Interventions:  - Monitor VS  - Assess for pain  - Ensure comfort    - See additional Care Plan goals for specific interventions   Outc appropriate  - Assess patient's ability to be responsible for managing their own health  - Refer to Case Management Department for coordinating discharge planning if the patient needs post-hospital services based on physician/LIP order or complex needs rel of bleeding or hemorrhage  - Monitor labs and vital signs for trends  - Administer supportive blood products/factors, fluids and medications as ordered and appropriate  - Administer supportive blood products/factors as ordered and appropriate  Outcome: Pro care for you? \"I want to try to avoid pain meds if I can. \"  - Provide timely, complete, and accurate information to patient/family  - Incorporate patient and family knowledge, values, beliefs, and cultural backgrounds into the planning and delivery of ca or PO as ordered and tolerated  - Nasogastric tube to low intermittent suction as ordered  - Evaluate effectiveness of ordered antiemetic medications  - Provide nonpharmacologic comfort measures as appropriate  - Advance diet as tolerated, if ordered  - Ob

## 2019-12-06 NOTE — PROGRESS NOTES
[facility] - [facility]     Vascular Surgery Progress Note    Norma Bloch Patient Status:  Inpatient    1970 MRN [id number]   Location [facility] 2W/SW Attending Salma Mejia MD   Hosp Day # 2 PCP Venice Choi MD     Denianara Martínez WBC 5.6 25.4* 12.2* 14.3*   HGB 13.0 13.1 11.7* 11.4*   .3* 101.6* 102.7* 100.6*   .0 180.0 120.0* 135.0*   INR 0.96  --   --   --        Recent Labs   Lab 11/30/19  1024 12/04/19  1130 12/05/19  0947 12/06/19  0427    142 139 141   K

## 2019-12-06 NOTE — PROGRESS NOTES
Patient to transfer from PCCU to CV room 308. Report given to Franko Montelongo RN. Medications, labs, plan of care reviewed. All belongings with patient. Nursing provided patient with current list of medications; reviewed purpose and side effects of medications.  Tiffanie Wilhelm

## 2019-12-07 VITALS
RESPIRATION RATE: 18 BRPM | WEIGHT: 141 LBS | HEART RATE: 77 BPM | TEMPERATURE: 98 F | SYSTOLIC BLOOD PRESSURE: 109 MMHG | OXYGEN SATURATION: 98 % | HEIGHT: 66 IN | BODY MASS INDEX: 22.66 KG/M2 | DIASTOLIC BLOOD PRESSURE: 84 MMHG

## 2019-12-07 PROCEDURE — 99239 HOSP IP/OBS DSCHRG MGMT >30: CPT | Performed by: INTERNAL MEDICINE

## 2019-12-07 RX ORDER — ACETAMINOPHEN 500 MG
1000 TABLET ORAL EVERY 8 HOURS PRN
Qty: 30 TABLET | Refills: 0 | Status: SHIPPED | COMMUNITY
Start: 2019-12-07 | End: 2020-08-10

## 2019-12-07 RX ORDER — HYDROCODONE BITARTRATE AND ACETAMINOPHEN 5; 325 MG/1; MG/1
1 TABLET ORAL EVERY 6 HOURS PRN
Qty: 10 TABLET | Refills: 0 | Status: SHIPPED | OUTPATIENT
Start: 2019-12-07 | End: 2020-08-10

## 2019-12-07 RX ORDER — IBUPROFEN 200 MG
600 TABLET ORAL EVERY 8 HOURS PRN
Qty: 30 TABLET | Refills: 0 | Status: SHIPPED | COMMUNITY
Start: 2019-12-07 | End: 2020-08-10

## 2019-12-07 RX ORDER — DOCUSATE SODIUM 100 MG/1
100 CAPSULE, LIQUID FILLED ORAL 2 TIMES DAILY
Status: DISCONTINUED | OUTPATIENT
Start: 2019-12-07 | End: 2019-12-07

## 2019-12-07 RX ORDER — SENNA AND DOCUSATE SODIUM 50; 8.6 MG/1; MG/1
2 TABLET, FILM COATED ORAL DAILY PRN
Qty: 30 TABLET | Refills: 0 | Status: SHIPPED | COMMUNITY
Start: 2019-12-07 | End: 2020-08-10

## 2019-12-07 NOTE — PLAN OF CARE
Problem: Patient/Family Goals  Goal: Patient/Family Long Term Goal  Description  Patient's Long Term Goal: Go home.     Interventions:  - Monitor VS  - Assess for pain  - Ensure comfort    - See additional Care Plan goals for specific interventions   Outc environment to reduce risk of injury  - Provide assistive devices as appropriate  - Consider OT/PT consult to assist with strengthening/mobility  - Encourage toileting schedule  Outcome: Progressing     Problem: DISCHARGE PLANNING  Goal: Discharge to home social support system  Outcome: Progressing     Problem: Altered Communication/Language Barrier  Goal: Patient/Family is able to understand and participate in their care  Description  Interventions:  - Assess communication ability and preferred communicati injury  Description  (Example usage: patient with low platelets)  INTERVENTIONS:  - Avoid intramuscular injections, enemas and rectal medication administration  - Ensure safe mobilization of patient  - Hold pressure on venipuncture sites to achieve adequat for signs of decreased coronary artery perfusion - ex.  Angina  - Evaluate fluid balance, assess for edema, trend weights  Outcome: Progressing     Problem: RESPIRATORY - ADULT  Goal: Achieves optimal ventilation and oxygenation  Description  INTERVENTIONS: family perspectives and choices   Outcome: Progressing   Pt feeling much better today. Ambulating in room. Vitals stable. Potential to d/c home later today. Will continue to monitor pt closely for changes.

## 2019-12-07 NOTE — DISCHARGE SUMMARY
[facility]    Discharge Summary    Duran [facility] Patient Status:  Inpatient    1970 MRN [id number]   Location [facility] 3W/SW Attending Stephanie Bills MD   Hosp Day # 3 PCP Carson Torres MD     Date of Admission: year      Anemia  Hgb 11.5; no ferrous sulfate due to nausea     Mild Thrombocytopenia  Platelets normalized to 147; she has had low platelets in the past and were as low as 115K     GERD (gastroesophageal reflux disease)  on pantoprazole 40 mg daily in ho Refills:  0     MULTI FOR HER OR      Take 1 tablet by mouth daily. Refills:  0     RESTASIS 0.05 % Emul  Generic drug:  cycloSPORINE      INSTILL 1 DROP INTO BOTH EYES BID.    Refills:  3           Where to Get Your Medications      These medications wer

## 2019-12-07 NOTE — PROGRESS NOTES
Pt AOx4, pain 2/10; refused pain medications. Skin check completed; only abdominal incision seen. Transitioned pt to general diet. Pt oriented to floor; no questions or concerns.

## 2019-12-07 NOTE — PLAN OF CARE
Problem: Patient/Family Goals  Goal: Patient/Family Long Term Goal  Description  Patient's Long Term Goal: Go home.     Interventions:  - Monitor VS  - Assess for pain  - Ensure comfort    - See additional Care Plan goals for specific interventions   Outc environment to reduce risk of injury  - Provide assistive devices as appropriate  - Consider OT/PT consult to assist with strengthening/mobility  - Encourage toileting schedule  Outcome: Progressing     Problem: DISCHARGE PLANNING  Goal: Discharge to home Monitor for areas of redness and/or skin breakdown  - Initiate interventions, skin care algorithm/standards of care as needed  Outcome: Progressing  Goal: Incision(s), wounds(s) or drain site(s) healing without S/S of infection  Description  INTERVENTIONS: Ensure adequate protection for wounds/incisions during mobilization  - Obtain PT/OT consults as needed  - Advance activity as appropriate  - Communicate ordered activity level and limitations with patient/family  Outcome: Progressing  Goal: Maintain proper status  - Assess for changes in mentation and behavior  - Position to facilitate oxygenation and minimize respiratory effort  - Oxygen supplementation based on oxygen saturation or ABGs  - Provide Smoking Cessation handout, if applicable  - Encourage bron

## 2019-12-07 NOTE — PROGRESS NOTES
[facility] - [facility]     Vascular Surgery Progress Note    Melissa Lan Patient Status:  Inpatient    1970 MRN [id number]   Location [facility] 3W/SW Attending Melissa Rudd MD   Hosp Day # 3 PCP MD Alea Dickson Q6H PRN  Enoxaparin Sodium (LOVENOX) 40 MG/0.4ML injection 40 mg, 40 mg, Subcutaneous, Daily  morphINE sulfate (PF) 4 MG/ML injection 2 mg, 2 mg, Intravenous, Q1H PRN    Or  morphINE sulfate (PF) 4 MG/ML injection 4 mg, 4 mg, Intravenous, Q1H PRN    Or  mo

## 2019-12-16 ENCOUNTER — TELEPHONE (OUTPATIENT)
Dept: INTERNAL MEDICINE CLINIC | Facility: CLINIC | Age: 49
End: 2019-12-16

## 2019-12-16 DIAGNOSIS — N30.00 ACUTE CYSTITIS WITHOUT HEMATURIA: Primary | ICD-10-CM

## 2019-12-16 NOTE — TELEPHONE ENCOUNTER
Dr. Yohannes Su, please advise. I spoke with patient and she was discharged from [facility] on 12/7/19. She said her period started that day. She had a catheter in the hospital. She has felt abdominal cramping, burning and heaviness since last Thursday.  She has incr

## 2019-12-16 NOTE — TELEPHONE ENCOUNTER
Pt. Had a left renal vein transplant on 12/04 pt. Has UTI symptoms she would like orders for a UA ph.  # [phone]   Routed high to clinical

## 2019-12-17 ENCOUNTER — APPOINTMENT (OUTPATIENT)
Dept: LAB | Facility: HOSPITAL | Age: 49
End: 2019-12-17
Attending: INTERNAL MEDICINE
Payer: COMMERCIAL

## 2019-12-17 PROCEDURE — 81001 URINALYSIS AUTO W/SCOPE: CPT | Performed by: INTERNAL MEDICINE

## 2019-12-17 PROCEDURE — 87086 URINE CULTURE/COLONY COUNT: CPT | Performed by: INTERNAL MEDICINE

## 2019-12-19 NOTE — TELEPHONE ENCOUNTER
UA with neg WBC, neg RBC, neg nitrite, neg LE, and neg UCX; no Abx indicated at this time; pt called

## 2019-12-19 NOTE — TELEPHONE ENCOUNTER
Pt saw surgeon, Dr Mckenna Barrios,  today and was advised 12/17 urine test was positive  Please call pt to advise on prescription     [phone]

## 2019-12-24 ENCOUNTER — HOSPITAL ENCOUNTER (OUTPATIENT)
Age: 49
Discharge: HOME | End: 2019-12-24
Attending: EMERGENCY MEDICINE
Payer: COMMERCIAL

## 2019-12-24 VITALS
HEIGHT: 66 IN | BODY MASS INDEX: 19.61 KG/M2 | SYSTOLIC BLOOD PRESSURE: 140 MMHG | RESPIRATION RATE: 16 BRPM | TEMPERATURE: 98 F | WEIGHT: 122 LBS | HEART RATE: 77 BPM | OXYGEN SATURATION: 100 % | DIASTOLIC BLOOD PRESSURE: 81 MMHG

## 2019-12-24 DIAGNOSIS — R30.0 DYSURIA: Primary | ICD-10-CM

## 2019-12-24 PROCEDURE — 81002 URINALYSIS NONAUTO W/O SCOPE: CPT

## 2019-12-24 PROCEDURE — 87086 URINE CULTURE/COLONY COUNT: CPT | Performed by: EMERGENCY MEDICINE

## 2019-12-24 PROCEDURE — 99213 OFFICE O/P EST LOW 20 MIN: CPT

## 2019-12-24 PROCEDURE — 99214 OFFICE O/P EST MOD 30 MIN: CPT

## 2019-12-24 NOTE — ED PROVIDER NOTES
Patient Seen in: [address]      History   Patient presents with:  Urinary Symptoms: post op 12/4    Stated Complaint: bladder problem     HPI    Patient is a 68-year-old female status post left renal vein surgery on Dec Smoking status: Former Smoker        Packs/day: 0.50        Years: 20.00        Pack years: 8        Quit date: 2010        Years since quittin.4      Smokeless tobacco: Never Used    Alcohol use:  Yes      Alcohol/week: 0.8 standard drinks      T Effort: Pulmonary effort is normal.      Breath sounds: Normal breath sounds. Abdominal:      General: Abdomen is flat. There is no distension. Tenderness: There is no tenderness. There is no guarding or rebound. Skin:     General: Skin is warm.

## 2019-12-24 NOTE — ED INITIAL ASSESSMENT (HPI)
Patient states having left renal vein surgery Dr. Hugo Montemayor on 12/4, states she started having pain on urination, increased urgency and frequency in urination since last week.   Patient states she was not put on a abx by Dr. Deidre Rose, trace amount of blood in

## 2019-12-29 ENCOUNTER — APPOINTMENT (OUTPATIENT)
Dept: LAB | Facility: HOSPITAL | Age: 49
End: 2019-12-29
Attending: INTERNAL MEDICINE
Payer: COMMERCIAL

## 2019-12-29 PROCEDURE — 80048 BASIC METABOLIC PNL TOTAL CA: CPT | Performed by: INTERNAL MEDICINE

## 2019-12-29 PROCEDURE — 36415 COLL VENOUS BLD VENIPUNCTURE: CPT | Performed by: INTERNAL MEDICINE

## 2020-01-29 ENCOUNTER — ORDER TRANSCRIPTION (OUTPATIENT)
Dept: PHYSICAL THERAPY | Facility: HOSPITAL | Age: 50
End: 2020-01-29

## 2020-01-29 ENCOUNTER — OFFICE VISIT (OUTPATIENT)
Dept: PHYSICAL THERAPY | Facility: HOSPITAL | Age: 50
End: 2020-01-29
Attending: UROLOGY
Payer: COMMERCIAL

## 2020-01-29 DIAGNOSIS — N32.81 OVERACTIVE BLADDER: ICD-10-CM

## 2020-01-29 DIAGNOSIS — N32.81 OVERACTIVE BLADDER: Primary | ICD-10-CM

## 2020-01-29 PROCEDURE — 97535 SELF CARE MNGMENT TRAINING: CPT

## 2020-01-29 PROCEDURE — 97140 MANUAL THERAPY 1/> REGIONS: CPT

## 2020-01-29 PROCEDURE — 97161 PT EVAL LOW COMPLEX 20 MIN: CPT

## 2020-01-29 NOTE — PROGRESS NOTES
PELVIC FLOOR EVALUATION:   Referring Physician: Dr. Ruby Castro  Diagnosis: Overactive bladder (N32.81)     Date of Onset: many years, post-op 12/4/19  Date of Service: 1/29/2020     PATIENT Adam Huerta is a 54year old female  who presents 12/4/19, but no stent placement. Reports change in color of urine since sx to a more yellowish tinge. Reports possible IC and GI issues contributing to symptoms. Current functional limitations:  Independent with ADLs, but does limit oneself when symptom during last month   Delivery Method:    Last menstrual period: -; hx of heavy periods/clotting   BCP: no  Surgical Hx: N/A    URINARY HABITS  Types of symptoms: stress incontinence, burning and nocturia; urinary urgency/frequency   Events constipated. Reports possible CON, occurring sporadically, small drops. Denies UUI. Denies dysuria, but notes difficulty initiating urine stream and emptying bladder when symptomatic.  Reports urinary frequency averages every 1.5-2.5 hours during the day wh 5/5 MARIANO LE except 4/5 in global proximal hip musculature   Transverse Abdominis: NT    Flexibility Summary:   Hamstring: WNL  Hip Flexors: WNL  Lateral Hip MM: WNL  Psoas: WNL  Adductors:  WNL   Lower Abdominals: mild restriction      Functional Screen   Do habits/posture, bladder normatives, pelvic floor function, pain management techniques, HEP, therapy progression, POC, objective findings.      Today's Treatment   1/29/2020, Visit: 1   HEP Female pelvic floor anatomy, bladder normatives/health, PF holds/jayne care.     Thank you for your referral. Please co-sign or sign and return this letter via fax as soon as possible to [phone].  If you have any questions, please contact me at Dept: [address]     Sincerely,  Electronically signed by: OMER Bonilla PT

## 2020-02-03 ENCOUNTER — OFFICE VISIT (OUTPATIENT)
Dept: INTERNAL MEDICINE CLINIC | Facility: CLINIC | Age: 50
End: 2020-02-03
Payer: COMMERCIAL

## 2020-02-03 VITALS
DIASTOLIC BLOOD PRESSURE: 78 MMHG | BODY MASS INDEX: 19.15 KG/M2 | TEMPERATURE: 98 F | WEIGHT: 119.19 LBS | HEART RATE: 60 BPM | SYSTOLIC BLOOD PRESSURE: 120 MMHG | HEIGHT: 66 IN | OXYGEN SATURATION: 100 %

## 2020-02-03 DIAGNOSIS — R31.9 HEMATURIA, UNSPECIFIED TYPE: ICD-10-CM

## 2020-02-03 DIAGNOSIS — I87.1 NUTCRACKER PHENOMENON OF RENAL VEIN: Primary | ICD-10-CM

## 2020-02-03 DIAGNOSIS — K20.90 ESOPHAGITIS: ICD-10-CM

## 2020-02-03 DIAGNOSIS — H69.80 DYSFUNCTION OF EUSTACHIAN TUBE, UNSPECIFIED LATERALITY: ICD-10-CM

## 2020-02-03 DIAGNOSIS — N30.10 INTERSTITIAL CYSTITIS: ICD-10-CM

## 2020-02-03 PROCEDURE — 99214 OFFICE O/P EST MOD 30 MIN: CPT | Performed by: INTERNAL MEDICINE

## 2020-02-03 PROCEDURE — 99212 OFFICE O/P EST SF 10 MIN: CPT | Performed by: INTERNAL MEDICINE

## 2020-02-03 RX ORDER — FAMOTIDINE 20 MG/1
20 TABLET ORAL 2 TIMES DAILY
Qty: 60 TABLET | Refills: 3 | Status: SHIPPED | OUTPATIENT
Start: 2020-02-03 | End: 2020-08-10

## 2020-02-03 RX ORDER — FLUTICASONE PROPIONATE 50 MCG
2 SPRAY, SUSPENSION (ML) NASAL DAILY
Qty: 1 BOTTLE | Refills: 5 | Status: SHIPPED | OUTPATIENT
Start: 2020-02-03 | End: 2020-08-10

## 2020-02-03 NOTE — PROGRESS NOTES
Qasim Cardoso is a 54year old female. HPI:   Patient presents with:   Follow - Up: F/U after surgery      55 y/o F with hematuria who had nutcracker phenomenon of left renal vein who underwent transposition on 12/4/19 with Dr Arminda Garcia; flank pain and p Outpatient Medications   Medication Sig Dispense Refill   • famoTIDine 20 MG Oral Tab Take 1 tablet (20 mg total) by mouth 2 (two) times daily. 60 tablet 3   • Fluticasone Propionate 50 MCG/ACT Nasal Suspension 2 sprays by Each Nare route daily.  1 Bottle 5 edema           ASSESSMENT/PLAN:   nutcracker phenomenon of renal vein  s/p left renal vein transposition on 12/4/19 by PV Dr Leonie Sparks; back and pelvic pain is much improved; has order for abdominal U/S in March 2020 pr Dr Leonie Sparks    Hematuria  CT urogram in patient's pneumonia in Aug 2017 as she was mildly thrombocytopenic on admission even prior to receiving Lovenox;  platelets now increased to 135      GERD (gastroesophageal reflux disease)  Was on pantoprazole 40 mg daily, though she stopped in Sept 2017 d

## 2020-02-05 ENCOUNTER — OFFICE VISIT (OUTPATIENT)
Dept: PHYSICAL THERAPY | Facility: HOSPITAL | Age: 50
End: 2020-02-05
Attending: UROLOGY
Payer: COMMERCIAL

## 2020-02-05 PROCEDURE — 97535 SELF CARE MNGMENT TRAINING: CPT

## 2020-02-05 PROCEDURE — 97140 MANUAL THERAPY 1/> REGIONS: CPT

## 2020-02-05 PROCEDURE — 97112 NEUROMUSCULAR REEDUCATION: CPT

## 2020-02-05 NOTE — PROGRESS NOTES
Dx: Overactive bladder (N32.81)         Authorized # of Visits/Insurance:  [facility]  Script Dates: 1/29/20 - 3/27/20           Next MD visit: none scheduled  Referring MD: Waqar Deshpande  Fall Risk:  standard             Medication Changes since last visit?: N Timed Treatment: 45 min  Total Treatment Time: 45 min  Therapist: Zarina Soto PT, DPT    Long Term Goals Timeframe (8 weeks, 1/29/20 - 3/27/20)  1.  Patient to be independent with progressive HEP during and upon discharge to aide with symptom management an

## 2020-02-12 ENCOUNTER — OFFICE VISIT (OUTPATIENT)
Dept: PHYSICAL THERAPY | Facility: HOSPITAL | Age: 50
End: 2020-02-12
Attending: UROLOGY
Payer: COMMERCIAL

## 2020-02-12 PROCEDURE — 97112 NEUROMUSCULAR REEDUCATION: CPT

## 2020-02-12 PROCEDURE — 97140 MANUAL THERAPY 1/> REGIONS: CPT

## 2020-02-12 NOTE — PROGRESS NOTES
Dx: Overactive bladder (N32.81)         Authorized # of Visits/Insurance:  [facility]  Script Dates: 1/29/20 - 3/27/20           Next MD visit: none scheduled  Referring MD: Hazel Ahumada  Fall Risk:  standard             Medication Changes since last visit?: N minimal cueing to correct. Addressed lower abdominal STRs with external manual techniques with patient tolerating well and notable release achieved. Instructed in bowel massage for HEP. Patient demonstrated understanding.       Plan: Continue to focus on bl

## 2020-02-13 ENCOUNTER — TELEPHONE (OUTPATIENT)
Dept: INTERNAL MEDICINE CLINIC | Facility: CLINIC | Age: 50
End: 2020-02-13

## 2020-02-13 DIAGNOSIS — K58.9 IRRITABLE BOWEL SYNDROME, UNSPECIFIED TYPE: Primary | ICD-10-CM

## 2020-02-13 NOTE — TELEPHONE ENCOUNTER
Spoke with patient and notified her that referral request was placed for [facility]. Informed her that request will be forwarded to managed care department. Can take 5-7 business days to get answer from insurance.  Informed she can follow up with us i

## 2020-02-13 NOTE — TELEPHONE ENCOUNTER
Pt requesting order for appt scheduled for 3/4/20 with nutritionist at [facility]  (YTZ#[id number])  Tasked to nursing

## 2020-02-13 NOTE — TELEPHONE ENCOUNTER
Good afternoon Eliot Mendoza,    I tried contacting patient to inform her that she does not need a referral to see nutritionist but no answer. Just an FYI, please let me know if you have any questions.     Kind Regards,    Alex Ashby-Referral Specialist

## 2020-02-19 ENCOUNTER — OFFICE VISIT (OUTPATIENT)
Dept: PHYSICAL THERAPY | Facility: HOSPITAL | Age: 50
End: 2020-02-19
Attending: UROLOGY
Payer: COMMERCIAL

## 2020-02-19 PROCEDURE — 97535 SELF CARE MNGMENT TRAINING: CPT

## 2020-02-19 PROCEDURE — 97140 MANUAL THERAPY 1/> REGIONS: CPT

## 2020-02-19 NOTE — PROGRESS NOTES
Dx: Overactive bladder (N32.81)         Authorized # of Visits/Insurance:  [facility]  Script Dates: 1/29/20 - 3/27/20           Next MD visit: none scheduled  Referring MD: Jewell Santos  Fall Risk:  standard             Medication Changes since last visit?: N Treatment   2/5/2020   Visit: 2 2/12/2020  Visit: 3 2/19/2020  Visit: 4  Notes/Precautions:    HEP     1/29: Female pelvic floor anatomy, bladder normatives/health, PF holds/quicks (3-5s x10; x5), toileting posture  2/5: dee miller info, bladder inhi to 40-50 oz/day to improve overall health/hydration. 3. Patient  to utilize proper toileting posture to allow for 4-5 BMs/week for improve bowel function. 4. Patient to report improved urinary frequency to every 2-4 hours to allow for independent ADLS.

## 2020-02-20 ENCOUNTER — OFFICE VISIT (OUTPATIENT)
Dept: GASTROENTEROLOGY | Facility: CLINIC | Age: 50
End: 2020-02-20
Payer: COMMERCIAL

## 2020-02-20 VITALS
HEIGHT: 66 IN | SYSTOLIC BLOOD PRESSURE: 118 MMHG | WEIGHT: 118 LBS | BODY MASS INDEX: 18.96 KG/M2 | DIASTOLIC BLOOD PRESSURE: 84 MMHG

## 2020-02-20 DIAGNOSIS — K58.2 IRRITABLE BOWEL SYNDROME WITH BOTH CONSTIPATION AND DIARRHEA: ICD-10-CM

## 2020-02-20 DIAGNOSIS — R14.0 BLOATING: ICD-10-CM

## 2020-02-20 DIAGNOSIS — K20.0 EOSINOPHILIC ESOPHAGITIS: Primary | ICD-10-CM

## 2020-02-20 PROCEDURE — 99212 OFFICE O/P EST SF 10 MIN: CPT | Performed by: INTERNAL MEDICINE

## 2020-02-20 PROCEDURE — 99204 OFFICE O/P NEW MOD 45 MIN: CPT | Performed by: INTERNAL MEDICINE

## 2020-02-20 RX ORDER — CLINDAMYCIN PHOSPHATE 11.9 MG/ML
SOLUTION TOPICAL
COMMUNITY
Start: 2020-02-04 | End: 2020-11-04

## 2020-02-20 NOTE — PATIENT INSTRUCTIONS
1.  I will contact you after review of the pathology results. 2.  Further recommendations pending the above review.

## 2020-02-20 NOTE — PROGRESS NOTES
HPI:    Patient ID: Zafar Camargo is a 54year old female. HPI  The patient is seen today at the request of her urologist, Dr. Waqar Deshpande for an additional opinion regarding chronic abdominal symptoms.   She is seen today with her  and sta constipation treated with senna, ibuprofen and acetaminophen. Stronger analgesics were not tolerated. Around this time the patient also developed a gnawing upper abdominal discomfort and constant hunger that would transiently improve with eating.   The had colon polyps and prostate cancer    Prior endoscopies and other testing:  EGD 1/19: Eosinophilic esophagitis (Dr. Suraj Trinidad)  High resolution esophageal manometry 2/16: Normal  EGD 12/14: \"Esophagitis, irregular Z line, gastritis, gastric erosions\". appearance   HENT:   Head: Normocephalic and atraumatic. Mouth/Throat: No oropharyngeal exudate. Eyes: Conjunctivae are normal. No scleral icterus. Neck: Neck supple. No thyromegaly present.    Cardiovascular: Normal rate, regular rhythm and normal he %      % 27.6 19.3     Monocytes %      % 9.6 8.4     Eosinophils %      % 5.3 2.5     Basophils %      % 0.7 0.4     Immature Granulocyte %      % 0.4 0.3     Glucose      70 - 99 mg/dL  73     Sodium      136 - 145 mmol/L  135 (L)     Potassium      3.5 IBS-mixed. There is likely a significant component of visceral hypersensitivity. We have discussed additional possibilities including small intestinal bacterial overgrowth, eosinophilic gastroenteritis or chronic infections such as Giardia.   I doubt stru

## 2020-02-25 ENCOUNTER — TELEPHONE (OUTPATIENT)
Dept: PHYSICAL THERAPY | Facility: HOSPITAL | Age: 50
End: 2020-02-25

## 2020-02-26 ENCOUNTER — APPOINTMENT (OUTPATIENT)
Dept: PHYSICAL THERAPY | Facility: HOSPITAL | Age: 50
End: 2020-02-26
Attending: UROLOGY
Payer: COMMERCIAL

## 2020-02-27 ENCOUNTER — OFFICE VISIT (OUTPATIENT)
Dept: PHYSICAL THERAPY | Facility: HOSPITAL | Age: 50
End: 2020-02-27
Attending: UROLOGY
Payer: COMMERCIAL

## 2020-02-27 PROCEDURE — 97535 SELF CARE MNGMENT TRAINING: CPT

## 2020-02-27 PROCEDURE — 97140 MANUAL THERAPY 1/> REGIONS: CPT

## 2020-02-27 NOTE — PROGRESS NOTES
Dx: Overactive bladder (N32.81)         Authorized # of Visits/Insurance:  [facility]  Script Dates: 1/29/20 - 3/27/20           Next MD visit: none scheduled  Referring MD: Diana Blackman  Fall Risk:  standard             Medication Changes since last visit?: N Power/Endurance/Reps/Fast) MMT: 2+/3/4/5  Accessory Muscle Use: gluteals, abdominals    Tissue Laxity Test:  Anterior Wall: WNL  Posterior Wall: WNL  Apical: WNL  Eccentric Lengthening/Valsalva: no displacement present      Today's Treatment   2/5/2020   V on bladder retraining, PF retraining/strengthening with emphasis on co-activation.      Charges: 2 Man, 1 SC     Total Timed Treatment: 40 min  Total Treatment Time: 45 min  Therapist: Becki Hernandez PT, HANNY    Long Term Goals Timeframe (8 weeks, 1/29/20 - 3/

## 2020-03-04 ENCOUNTER — OFFICE VISIT (OUTPATIENT)
Dept: INTEGRATIVE MEDICINE | Facility: CLINIC | Age: 50
End: 2020-03-04
Payer: COMMERCIAL

## 2020-03-04 ENCOUNTER — OFFICE VISIT (OUTPATIENT)
Dept: PHYSICAL THERAPY | Facility: HOSPITAL | Age: 50
End: 2020-03-04
Attending: UROLOGY
Payer: COMMERCIAL

## 2020-03-04 DIAGNOSIS — K58.2 IRRITABLE BOWEL SYNDROME WITH BOTH CONSTIPATION AND DIARRHEA: ICD-10-CM

## 2020-03-04 PROCEDURE — 97802 MEDICAL NUTRITION INDIV IN: CPT | Performed by: NUTRITIONIST

## 2020-03-04 PROCEDURE — 97140 MANUAL THERAPY 1/> REGIONS: CPT

## 2020-03-04 PROCEDURE — 97112 NEUROMUSCULAR REEDUCATION: CPT

## 2020-03-04 NOTE — PROGRESS NOTES
Patient referred by Dr. Bailey ref. provider found  Did patient complete Nutritional Therapy Questionnaire? YES  Email: [email or web address]     Kary Mann is a 54year old female presenting for medical nutrition therapy in regards to supporting a vegan diet Patient aware of risks and benefits and consented to medical nutrition therapy.       Future considerations: L-glutamine, Berberine, Probiotics    Follow up PRN    Time spent with patient: 60 minutes    Dietary Supplements:   none    BORIS Cedillo

## 2020-03-04 NOTE — PROGRESS NOTES
Dx: Overactive bladder (N32.81)         Authorized # of Visits/Insurance:  [facility]  Script Dates: 1/29/20 - 3/27/20           Next MD visit: none scheduled  Referring MD: Waqar Deshpande  Fall Risk:  standard             Medication Changes since last visit?: N relaxation techniques to inhibit bladder urgencies     Therapeutic Activity/Self-Care Discussion of diet/nutritional consult   Review of HEP, bowel habits/function, nutritional consult  Review of HEP, discussion of bowel/bladder symptoms/regularity

## 2020-03-06 NOTE — PATIENT INSTRUCTIONS
The products and items listed below (the “Products”)  and their claims have not been evaluated by the Food and Drug Administration. Dietary products are not intended to treat, prevent, mitigate or cure disease.  Ultimately, you must draw your own conclusi yogurt  • ½ cup oatmilk  • 1/8 teaspoon pumpkin pie spice  • ½ cup ice cubes  • can add additional cinnamon to taste  makes 2 servings, can save for a later snack or in addition to a meal later in the day • Juice of 1 lime  • 2-3 cups of baby spinach  • 1

## 2020-03-07 ENCOUNTER — TELEPHONE (OUTPATIENT)
Dept: GASTROENTEROLOGY | Facility: CLINIC | Age: 50
End: 2020-03-07

## 2020-03-07 DIAGNOSIS — R19.7 DIARRHEA, UNSPECIFIED TYPE: Primary | ICD-10-CM

## 2020-03-07 NOTE — TELEPHONE ENCOUNTER
I left a message on the patient's personal voicemail that I reviewed her previous endoscopy records. I will contact the patient next week to discuss this review and my recommendations for next steps.

## 2020-03-10 ENCOUNTER — OFFICE VISIT (OUTPATIENT)
Dept: OTOLARYNGOLOGY | Facility: CLINIC | Age: 50
End: 2020-03-10
Payer: COMMERCIAL

## 2020-03-10 ENCOUNTER — OFFICE VISIT (OUTPATIENT)
Dept: AUDIOLOGY | Facility: CLINIC | Age: 50
End: 2020-03-10
Payer: COMMERCIAL

## 2020-03-10 VITALS
BODY MASS INDEX: 18.96 KG/M2 | HEIGHT: 66 IN | SYSTOLIC BLOOD PRESSURE: 113 MMHG | WEIGHT: 118 LBS | DIASTOLIC BLOOD PRESSURE: 63 MMHG | TEMPERATURE: 98 F

## 2020-03-10 DIAGNOSIS — H90.3 SENSORINEURAL HEARING LOSS, BILATERAL: Primary | ICD-10-CM

## 2020-03-10 DIAGNOSIS — H69.82 DYSFUNCTION OF LEFT EUSTACHIAN TUBE: Primary | ICD-10-CM

## 2020-03-10 PROCEDURE — 92557 COMPREHENSIVE HEARING TEST: CPT | Performed by: AUDIOLOGIST

## 2020-03-10 PROCEDURE — 99213 OFFICE O/P EST LOW 20 MIN: CPT | Performed by: OTOLARYNGOLOGY

## 2020-03-10 PROCEDURE — 99212 OFFICE O/P EST SF 10 MIN: CPT | Performed by: OTOLARYNGOLOGY

## 2020-03-10 PROCEDURE — 92567 TYMPANOMETRY: CPT | Performed by: AUDIOLOGIST

## 2020-03-10 NOTE — PROGRESS NOTES
Lydia So is a 54year old female.  Patient presents with:  Sinus Problem: sinus headache, sinus pressure, post nasal drip, clogged ears for 4 months    HPI:   She is in a feeling of fullness and blockage in her ears left greater than right over the of colonic polyps     colonoscopy 2013 was normal   • Gastro-esophageal reflux disease with esophagitis    • Hemorrhoids    • History of bladder infections    • IBS (irritable bowel syndrome)    • Meningitis     at age 10 or 17   • Pneumonia 08/2017   • PO Eyes Normal Conjunctiva - Right: Normal, Left: Normal. Pupil - Right: Normal, Left: Normal.    Ears Normal Inspection - Right: Normal, Left: Normal. Canal - Left: Normal. TM - Right: Normal, Left: Normal.  Ear canals and tympanic membranes completely carole

## 2020-03-11 ENCOUNTER — OFFICE VISIT (OUTPATIENT)
Dept: PHYSICAL THERAPY | Facility: HOSPITAL | Age: 50
End: 2020-03-11
Attending: UROLOGY
Payer: COMMERCIAL

## 2020-03-11 PROCEDURE — 97140 MANUAL THERAPY 1/> REGIONS: CPT

## 2020-03-11 PROCEDURE — 97535 SELF CARE MNGMENT TRAINING: CPT

## 2020-03-11 NOTE — PROGRESS NOTES
Dx: Overactive bladder (N32.81)         Authorized # of Visits/Insurance:  [facility]  Script Dates: 1/29/20 - 3/27/20           Next MD visit: none scheduled  Referring MD: Jewell Santos  Fall Risk:  standard             Medication Changes since last visit?: N tested not tested      Sensation  Verbalized temp difference of gel: yes    Pelvic Floor Muscle strength: (PERF= Power/Endurance/Reps/Fast) MMT: 3/3-5/2/5  Accessory Muscle Use: gluteals, abdominals    Tissue Laxity Test:  Anterior Wall: WNL  Posterior Adi Muhammad Review of HEP, discussion of bowel/bladder symptoms/regularity   Review of HEP, discussion of bowel/bladder symptoms/regularity, relaxation/down training techniques        Assessment: Spent beginning of sessions discussing symptoms and POC.  Advised on disc

## 2020-03-12 ENCOUNTER — PATIENT MESSAGE (OUTPATIENT)
Dept: GASTROENTEROLOGY | Facility: CLINIC | Age: 50
End: 2020-03-12

## 2020-03-12 NOTE — PROGRESS NOTES
AUDIOLOGY REPORT      Alicia Galvan is a 54year old female     Referring Provider: Cat Abreu   YOB: 1970  Medical Record: [id number]      Patient Hearing History:  Patient was referred by Dr. Letitia Sotelo.        Otoscopic Inspection:  Right

## 2020-03-13 NOTE — TELEPHONE ENCOUNTER
I spoke to [facility]. Her symptoms have worsened. She has gas (flatulence) that can be \"uncontrollable\". The gas is also \"toxic\". I have reviewed the previous endoscopy reports. The stomach was biopsied (no eosinophilic infiltration).   A small bowel

## 2020-03-13 NOTE — TELEPHONE ENCOUNTER
From: Tisha Buckley  To: Jean-Paul Lynn MD  Sent: 3/12/2020 4:59 PM CDT  Subject: Visit [facility] Dr. Surendra Eller! I left a message yesterday inquiring about what testing or treatment options you have in mind for me.  The sympto

## 2020-03-14 ENCOUNTER — APPOINTMENT (OUTPATIENT)
Dept: LAB | Facility: HOSPITAL | Age: 50
End: 2020-03-14
Attending: INTERNAL MEDICINE
Payer: COMMERCIAL

## 2020-03-14 DIAGNOSIS — R19.7 DIARRHEA, UNSPECIFIED TYPE: ICD-10-CM

## 2020-03-14 LAB
ADENOVIRUS F 40/41 PCR: NEGATIVE
ASTROVIRUS PCR: NEGATIVE
C CAYETANENSIS DNA SPEC QL NAA+PROBE: NEGATIVE
CAMPY SP DNA.DIARRHEA STL QL NAA+PROBE: NEGATIVE
CRYPTOSP DNA SPEC QL NAA+PROBE: NEGATIVE
EAEC PAA PLAS AGGR+AATA ST NAA+NON-PRB: NEGATIVE
EC STX1+STX2 + H7 FLIC SPEC NAA+PROBE: NEGATIVE
ENTAMOEBA HISTOLYTICA PCR: NEGATIVE
EPEC EAE GENE STL QL NAA+NON-PROBE: NEGATIVE
ETEC LTA+ST1A+ST1B TOX ST NAA+NON-PROBE: NEGATIVE
GIARDIA LAMBLIA PCR: NEGATIVE
NOROVIRUS GI/GII PCR: NEGATIVE
P SHIGELLOIDES DNA STL QL NAA+PROBE: NEGATIVE
ROTAVIRUS A PCR: NEGATIVE
SALMONELLA DNA SPEC QL NAA+PROBE: NEGATIVE
SAPOVIRUS PCR: NEGATIVE
SHIGELLA SP+EIEC IPAH ST NAA+NON-PROBE: NEGATIVE
V CHOLERAE DNA SPEC QL NAA+PROBE: NEGATIVE
VIBRIO DNA SPEC NAA+PROBE: NEGATIVE
YERSINIA DNA SPEC NAA+PROBE: NEGATIVE

## 2020-03-14 PROCEDURE — 87507 IADNA-DNA/RNA PROBE TQ 12-25: CPT

## 2020-03-14 PROCEDURE — 83993 ASSAY FOR CALPROTECTIN FECAL: CPT | Performed by: INTERNAL MEDICINE

## 2020-03-16 ENCOUNTER — TELEPHONE (OUTPATIENT)
Dept: PHYSICAL THERAPY | Facility: HOSPITAL | Age: 50
End: 2020-03-16

## 2020-03-16 NOTE — TELEPHONE ENCOUNTER
Contacted pt to discuss department's initiative to protect all patients from COVID-19 exposure. Discussed recommendations relative to pt's home program and POC. Explained that the clinic is cancelling visits for the next two weeks.  Future appts confirmed a

## 2020-03-17 LAB — CALPROTECTIN STL-MCNT: 20.8 ΜG/G (ref ?–50)

## 2020-03-18 ENCOUNTER — APPOINTMENT (OUTPATIENT)
Dept: PHYSICAL THERAPY | Facility: HOSPITAL | Age: 50
End: 2020-03-18
Attending: UROLOGY
Payer: COMMERCIAL

## 2020-04-06 ENCOUNTER — TELEPHONE (OUTPATIENT)
Dept: PHYSICAL THERAPY | Facility: HOSPITAL | Age: 50
End: 2020-04-06

## 2020-04-06 NOTE — TELEPHONE ENCOUNTER
Contacted pt to explain that due to the continued COVID-19 outbreak and keeping with the social distancing/shelter in place recommendations, all non-essential outpatient PT will be delayed until 5/4/20.  Discussed recommendations relative to pt's home progr

## 2020-04-16 ENCOUNTER — APPOINTMENT (OUTPATIENT)
Dept: PHYSICAL THERAPY | Facility: HOSPITAL | Age: 50
End: 2020-04-16
Attending: UROLOGY
Payer: COMMERCIAL

## 2020-04-29 ENCOUNTER — PATIENT MESSAGE (OUTPATIENT)
Dept: GASTROENTEROLOGY | Facility: CLINIC | Age: 50
End: 2020-04-29

## 2020-04-29 NOTE — TELEPHONE ENCOUNTER
From: Alin Davenport  To: Alia Macdonald MD  Sent: 4/29/2020 12:54 PM CDT  Subject: Visit Hao Harris! It's been about 5 days since ending the meds. The toxic flatulence has significantly decreased.  However, there i

## 2020-04-29 NOTE — TELEPHONE ENCOUNTER
Dr Michael Jurado    Patient provided below update via [employer] after taking antibiotic treatment.       Please advise

## 2020-04-30 ENCOUNTER — TELEPHONE (OUTPATIENT)
Dept: PHYSICAL THERAPY | Facility: HOSPITAL | Age: 50
End: 2020-04-30

## 2020-04-30 NOTE — TELEPHONE ENCOUNTER
Spoke with patient regarding the current COVID-19 situation and the governor's extension of the shelter in place order to May 31th. Discussed current progression with PT, and options for future appts.  Stated she wanted to wait to return to PT in June when

## 2020-04-30 NOTE — TELEPHONE ENCOUNTER
Dr. Litzy Adame-    Pt accepted the following VIDEO visit per Fernandez Barrientos RN message below:  Future Appointments   Date Time Provider Rani Carmen   5/7/2020  2:00 PM Mehul Jeronimo  S[facility] PDF pasted below for your review:

## 2020-04-30 NOTE — TELEPHONE ENCOUNTER
Dr. Jes Guadalupe    This patient accepted a video visit appointment with you next week. Below is an attached PDF of an abdominal ultrasound from the patient.     Thank you

## 2020-04-30 NOTE — TELEPHONE ENCOUNTER
Noted, appreciated and reviewed! Quality 130: Documentation Of Current Medications In The Medical Record: Current Medications Documented Quality 226: Preventive Care And Screening: Tobacco Use: Screening And Cessation Intervention: Patient screened for tobacco use and is an ex/non-smoker Detail Level: Detailed

## 2020-04-30 NOTE — TELEPHONE ENCOUNTER
Agree with the video visit. I am unable to see the attached PDF (unless you are able to locate). If we are not please have the patient have the ultrasound report available at the time of her video visit.

## 2020-05-05 ENCOUNTER — APPOINTMENT (OUTPATIENT)
Dept: LAB | Age: 50
End: 2020-05-05
Attending: INTERNAL MEDICINE
Payer: COMMERCIAL

## 2020-05-05 ENCOUNTER — TELEPHONE (OUTPATIENT)
Dept: INTERNAL MEDICINE CLINIC | Facility: CLINIC | Age: 50
End: 2020-05-05

## 2020-05-05 DIAGNOSIS — N30.10 INTERSTITIAL CYSTITIS: ICD-10-CM

## 2020-05-05 DIAGNOSIS — N30.00 ACUTE CYSTITIS WITHOUT HEMATURIA: Primary | ICD-10-CM

## 2020-05-05 DIAGNOSIS — B37.9 YEAST INFECTION: ICD-10-CM

## 2020-05-05 PROCEDURE — 99212 OFFICE O/P EST SF 10 MIN: CPT | Performed by: INTERNAL MEDICINE

## 2020-05-05 PROCEDURE — 87086 URINE CULTURE/COLONY COUNT: CPT | Performed by: INTERNAL MEDICINE

## 2020-05-05 PROCEDURE — 81001 URINALYSIS AUTO W/SCOPE: CPT | Performed by: INTERNAL MEDICINE

## 2020-05-05 RX ORDER — NITROFURANTOIN 25; 75 MG/1; MG/1
100 CAPSULE ORAL 2 TIMES DAILY
Qty: 14 CAPSULE | Refills: 0 | Status: SHIPPED | OUTPATIENT
Start: 2020-05-05 | End: 2020-08-10

## 2020-05-05 RX ORDER — FLUCONAZOLE 150 MG/1
150 TABLET ORAL ONCE
Qty: 2 TABLET | Refills: 0 | Status: SHIPPED | OUTPATIENT
Start: 2020-05-05 | End: 2020-05-05

## 2020-05-05 NOTE — TELEPHONE ENCOUNTER
To Dr. Emily Appiah -    UTI symptoms:    [x]Frequency  [x]Urgency  [x]Pain/burning  []Blood in urine  []Low back pain  []Flank pain  []Fevers/chills  []Odor  []Confusion    NOTES:    Pt reports \"white discharge\" when she urinates.   States it is coming from u

## 2020-05-05 NOTE — TELEPHONE ENCOUNTER
Virtual Visit/Telephone Note    Rachel Davenport verbally consents to a Virtual/Telephone Check-In service on 04/07/20. Patient understands and accepts financial responsibility for any deductible, co-insurance and/or co-pays associated with this service.

## 2020-05-07 ENCOUNTER — VIRTUAL PHONE E/M (OUTPATIENT)
Dept: GASTROENTEROLOGY | Facility: CLINIC | Age: 50
End: 2020-05-07
Payer: COMMERCIAL

## 2020-05-07 ENCOUNTER — TELEPHONE (OUTPATIENT)
Dept: INTERNAL MEDICINE CLINIC | Facility: CLINIC | Age: 50
End: 2020-05-07

## 2020-05-07 DIAGNOSIS — R14.0 ABDOMINAL DISTENSION: ICD-10-CM

## 2020-05-07 DIAGNOSIS — R10.84 GENERALIZED ABDOMINAL PAIN: Primary | ICD-10-CM

## 2020-05-07 PROCEDURE — 99213 OFFICE O/P EST LOW 20 MIN: CPT | Performed by: INTERNAL MEDICINE

## 2020-05-07 NOTE — PROGRESS NOTES
Virtual/Telephone Check-In    Rachel Davenport verbally consents to a Virtual/Telephone Check-In service on 05/07/20    The patient/family understand and accept financial responsibility for any deductible, co-insurance and/or co-pays associated with this s bladder pain. Urinalysis was unremarkable. She was prescribed nitrofurantoin and fluconazole. She feels that her symptoms are improving. She carries a diagnosis of interstitial cystitis.     Other pertinent issues include biopsy-proven eosinophilic esop restrictions of visitation. There are limitations to this visit as no physical exam could be performed. Every effort was taken to allow for sufficient and adequate time.  This billing was spent on reviewing labs, medications, radiology tests and decision ma

## 2020-05-07 NOTE — TELEPHONE ENCOUNTER
nursing call them, I left this message on [employer], try to reiterate-    Jaida Bell, here is the final results on the culture, does not look like there is growing much bacteria there, but regardless I put you on a very light antibiotic, and I want you to finish

## 2020-05-13 ENCOUNTER — APPOINTMENT (OUTPATIENT)
Dept: PHYSICAL THERAPY | Facility: HOSPITAL | Age: 50
End: 2020-05-13
Attending: UROLOGY
Payer: COMMERCIAL

## 2020-05-15 ENCOUNTER — HOSPITAL ENCOUNTER (OUTPATIENT)
Dept: MRI IMAGING | Facility: HOSPITAL | Age: 50
Discharge: HOME | End: 2020-05-15
Attending: INTERNAL MEDICINE
Payer: COMMERCIAL

## 2020-05-15 DIAGNOSIS — R10.84 GENERALIZED ABDOMINAL PAIN: ICD-10-CM

## 2020-05-15 DIAGNOSIS — R14.0 ABDOMINAL DISTENSION: ICD-10-CM

## 2020-05-15 PROCEDURE — 72197 MRI PELVIS W/O & W/DYE: CPT | Performed by: INTERNAL MEDICINE

## 2020-05-15 PROCEDURE — 74183 MRI ABD W/O CNTR FLWD CNTR: CPT | Performed by: INTERNAL MEDICINE

## 2020-05-15 PROCEDURE — A9575 INJ GADOTERATE MEGLUMI 0.1ML: HCPCS | Performed by: INTERNAL MEDICINE

## 2020-05-15 NOTE — IMAGING NOTE
[id number] Identified patient with full name and . Allergies reviewed. Injection explained and all questions and concerns answered. 1 mg glucagon mixed with 1 ml sterile water. Right deltoid cleaned with alcohol.  25 gauge needle inserted and aspirated with no

## 2020-05-21 ENCOUNTER — PATIENT MESSAGE (OUTPATIENT)
Dept: GASTROENTEROLOGY | Facility: CLINIC | Age: 50
End: 2020-05-21

## 2020-05-21 DIAGNOSIS — K63.89 BACTERIAL OVERGROWTH SYNDROME: Primary | ICD-10-CM

## 2020-05-21 NOTE — TELEPHONE ENCOUNTER
From: Nuvia Davenport  To: Meghan Johnson MD  Sent: 5/21/2020 12:01 PM CDT  Subject: Test Results Charles Jurado! Following the MRI I would like to try the SIBO testing.  But I wanted to ask if the endoscopy is necessary as well a

## 2020-05-21 NOTE — TELEPHONE ENCOUNTER
Dr. Evan Guo    Please advise on MRI results and further testing per below MyChart message.     Thank you

## 2020-05-21 NOTE — TELEPHONE ENCOUNTER
GI RN staff: I sent a response back to the patient. Please assist her in scheduling a lactulose hydrogen breath test at [facility].

## 2020-05-21 NOTE — TELEPHONE ENCOUNTER
Referral to [facility] entered per below order.   Referral/facesheet/insurance faxed to [facility] at [phone]    Patient sent [employer] message with number to contact Jillian to schedule this test.    Thank you

## 2020-06-26 NOTE — TELEPHONE ENCOUNTER
Referral sent to [facility] for lactulose hydrogen breath test for SIBO. @ [phone]. Further instructions sent through [employer].

## 2020-06-29 ENCOUNTER — OFFICE VISIT (OUTPATIENT)
Dept: AUDIOLOGY | Facility: CLINIC | Age: 50
End: 2020-06-29
Payer: COMMERCIAL

## 2020-06-29 DIAGNOSIS — H90.3 SENSORINEURAL HEARING LOSS, BILATERAL: Primary | ICD-10-CM

## 2020-06-29 PROCEDURE — 92567 TYMPANOMETRY: CPT | Performed by: AUDIOLOGIST

## 2020-06-29 NOTE — PROGRESS NOTES
Zafar Camargo is a 54year old female.   Patient presents with:  Ear Problem: pt here for a follow up regarding fluid in both ears, per pt no improvment with flonase       HISTORY OF PRESENT ILLNESS  9/29/2017   Patient prevents for evaluation of a peggy herself she starts working out she will have a bunch of clear drainage which then goes away.   In spite of being on the Flonase for all these months this has not changed the symptoms she does not have any headaches purulent drainage dizziness or fever or fa 12/4/2019    Performed by Dhruv Templeton MD at [facility] Neg/Pos Details   Constitutional Negative Fatigue, fever and weight loss. ENMT Negative Drooling. Eyes Negative Blurred vision and vision changes.    Respir 100% at 15 dB SRT. I did repeat the tympanograms again today which were normal   Skin Normal Inspection - Normal.        Lymph Detail  Normal Submental.  Tender Submandibular. Anterior cervical. Posterior cervical. Supraclavicular.               Nose/Mouth Fatty Acids (FISH OIL) 1200 MG Oral Cap, Take by mouth., Disp: , Rfl:   ASSESSMENT AND PLAN    1.bruxism and eustachian tube dysfunction  Suspect that her sensation of pressure and squishiness in the left ear may be related to TMJ inflammation.   Antoni Dominguez

## 2020-06-29 NOTE — PROGRESS NOTES
IMMITANCE TESTING    Tympanometry only per Dr. Joel Gar     Classification: Bilateral:  Type A: normal tympanogram  Ear canal volume: Right . 9 mL, Left . 9 mL  Peak middle ear pressure: Right 5 daP, Left: -5 daP  Static compliance: Right . 6 mL, Left . 6 mL

## 2020-07-01 ENCOUNTER — OFFICE VISIT (OUTPATIENT)
Dept: PHYSICAL THERAPY | Facility: HOSPITAL | Age: 50
End: 2020-07-01
Attending: OTOLARYNGOLOGY
Payer: COMMERCIAL

## 2020-07-01 DIAGNOSIS — F45.8 BRUXISM: ICD-10-CM

## 2020-07-01 PROCEDURE — 97161 PT EVAL LOW COMPLEX 20 MIN: CPT

## 2020-07-01 PROCEDURE — 97140 MANUAL THERAPY 1/> REGIONS: CPT

## 2020-07-01 NOTE — PROGRESS NOTES
TMJ EVALUATION:   Referring Physician: Dr. Hermelinda Cunningham  Diagnosis: Bruxism (F45.8)  Date of Onset: 2 years     Date of Service: 7/1/2020     PATIENT SUMMARY   Alicia Galvan is a 54year old y/o female who presents to therapy today with complaints of \"spo • Acne    • Acute meniscal tear of right knee     s/p arthroscopy 2006   • Arthritis    • Chronic UTI (urinary tract infection)    • Esophageal reflux    • Family history of colonic polyps     colonoscopy 2013 was normal   • Gastro-esophageal reflux dise Therapy to address the above stated impairments in order to return patient to prior level of function. Patient was instructed in and administered HEP; patient verbally and visually demonstrated understanding.           OBJECTIVE:     Observation/Posture: mo tension first thing in the am to improve tolerance with talking, chewing, and yawning. 3. Patient to report 25% reduction in sinus HAs for improved pain management.    4. Patient to demonstrate 4+/5 global postural/scapular strength for improved postural

## 2020-07-07 ENCOUNTER — OFFICE VISIT (OUTPATIENT)
Dept: PHYSICAL THERAPY | Facility: HOSPITAL | Age: 50
End: 2020-07-07
Attending: OTOLARYNGOLOGY
Payer: COMMERCIAL

## 2020-07-07 PROCEDURE — 97140 MANUAL THERAPY 1/> REGIONS: CPT

## 2020-07-07 NOTE — PROGRESS NOTES
Dx: Bruxism (F45.8)         Authorized # of Visits/Insurance: [facility]  Script Dates: 7/1/20 - 8/28/20           Next MD visit: none scheduled  Referring MD: Elmer Cm  Fall Risk:  standard             Medication Changes since last visit?: No  Precautions: n significant reduction in TMJ tension post tx. Plan: Continue to focus on reducing STRs throughout B TMJ/cervicothoracic region to improve TMJ/cervical ROM/function with progression of strengthening/stabilization as tolerated.      Charges: 3 Man       T

## 2020-07-08 ENCOUNTER — APPOINTMENT (OUTPATIENT)
Dept: PHYSICAL THERAPY | Facility: HOSPITAL | Age: 50
End: 2020-07-08
Attending: OTOLARYNGOLOGY
Payer: COMMERCIAL

## 2020-07-14 ENCOUNTER — APPOINTMENT (OUTPATIENT)
Dept: PHYSICAL THERAPY | Facility: HOSPITAL | Age: 50
End: 2020-07-14
Attending: OTOLARYNGOLOGY
Payer: COMMERCIAL

## 2020-07-16 ENCOUNTER — OFFICE VISIT (OUTPATIENT)
Dept: PHYSICAL THERAPY | Facility: HOSPITAL | Age: 50
End: 2020-07-16
Attending: OTOLARYNGOLOGY
Payer: COMMERCIAL

## 2020-07-16 PROCEDURE — 97140 MANUAL THERAPY 1/> REGIONS: CPT

## 2020-07-16 NOTE — PROGRESS NOTES
Dx: Bruxism (F45.8)         Authorized # of Visits/Insurance: [facility]  Script Dates: 7/1/20 - 8/28/20           Next MD visit: none scheduled  Referring MD: Cecily Walters  Fall Risk:  standard             Medication Changes since last visit?: No  Precautions: n on reducing STRs throughout B TMJ/cervicothoracic region to improve TMJ/cervical ROM/function with progression of strengthening/stabilization as tolerated.      Charges: 3 Man       Total Timed Treatment: 40 min  Total Treatment Time: 40 min  Therapist: Katie Bailey

## 2020-07-17 ENCOUNTER — OFFICE VISIT (OUTPATIENT)
Dept: INTEGRATIVE MEDICINE | Facility: CLINIC | Age: 50
End: 2020-07-17
Payer: COMMERCIAL

## 2020-07-17 DIAGNOSIS — R10.30 LOWER ABDOMINAL PAIN: Primary | ICD-10-CM

## 2020-07-17 PROCEDURE — 97811 ACUP 1/> W/O ESTIM EA ADD 15: CPT | Performed by: ACUPUNCTURIST

## 2020-07-17 PROCEDURE — 97810 ACUP 1/> WO ESTIM 1ST 15 MIN: CPT | Performed by: ACUPUNCTURIST

## 2020-07-17 NOTE — PROGRESS NOTES
Gorge Bustillo is a 54year old female Acupuncture Therapy. Complaints:  1. Lower abdominal discomfort 1-6/10  2. Borborygmus 5/10  3.  Periodic urinary urgency 1-6/10    HX: Diagnosed with interstitial cystitis which has been problematic for several ye

## 2020-07-23 ENCOUNTER — OFFICE VISIT (OUTPATIENT)
Dept: PHYSICAL THERAPY | Facility: HOSPITAL | Age: 50
End: 2020-07-23
Attending: OTOLARYNGOLOGY
Payer: COMMERCIAL

## 2020-07-23 PROCEDURE — 97140 MANUAL THERAPY 1/> REGIONS: CPT

## 2020-07-23 NOTE — PROGRESS NOTES
Dx: Bruxism (F45.8)         Authorized # of Visits/Insurance: [facility]  Script Dates: 7/1/20 - 8/28/20           Next MD visit: none scheduled  Referring MD: Ronni Packer  Fall Risk:  standard             Medication Changes since last visit?: No  Precautions: n tension in B jaw at end of session. Updated HEP to include resisted postural strengthening in standing. Patient demonstrated understanding.      Plan: Continue to focus on reducing STRs throughout B TMJ/cervicothoracic region to improve TMJ/cervical ROM/fun

## 2020-07-24 ENCOUNTER — OFFICE VISIT (OUTPATIENT)
Dept: INTEGRATIVE MEDICINE | Facility: CLINIC | Age: 50
End: 2020-07-24
Payer: COMMERCIAL

## 2020-07-24 DIAGNOSIS — R10.30 LOWER ABDOMINAL PAIN: Primary | ICD-10-CM

## 2020-07-24 PROCEDURE — 97811 ACUP 1/> W/O ESTIM EA ADD 15: CPT | Performed by: ACUPUNCTURIST

## 2020-07-24 PROCEDURE — 97810 ACUP 1/> WO ESTIM 1ST 15 MIN: CPT | Performed by: ACUPUNCTURIST

## 2020-07-25 NOTE — PROGRESS NOTES
Lise Paz is a 54year old female Acupuncture Therapy. Complaints:  1. Lower abdominal discomfort 1-4/10  2. Borborygmus 5/10  3.  Periodic urinary urgency 3-6/10     HX: Diagnosed with interstitial cystitis which has been problematic for several y

## 2020-07-27 ENCOUNTER — OFFICE VISIT (OUTPATIENT)
Dept: INTEGRATIVE MEDICINE | Facility: CLINIC | Age: 50
End: 2020-07-27
Payer: COMMERCIAL

## 2020-07-27 DIAGNOSIS — R10.30 LOWER ABDOMINAL PAIN: Primary | ICD-10-CM

## 2020-07-27 PROCEDURE — 97811 ACUP 1/> W/O ESTIM EA ADD 15: CPT | Performed by: ACUPUNCTURIST

## 2020-07-27 PROCEDURE — 97810 ACUP 1/> WO ESTIM 1ST 15 MIN: CPT | Performed by: ACUPUNCTURIST

## 2020-07-27 NOTE — PROGRESS NOTES
Tray Rosenberg is a 54year old female Acupuncture Therapy. Complaints:  1. Lower abdominal discomfort 1-4/10  2. Borborygmus 5/10  3. Periodic urinary urgency 3-6/10  4.  Jaw pain -TMJD     HX: Diagnosed with interstitial cystitis which has been proble

## 2020-07-30 ENCOUNTER — TELEPHONE (OUTPATIENT)
Dept: GASTROENTEROLOGY | Facility: CLINIC | Age: 50
End: 2020-07-30

## 2020-07-30 ENCOUNTER — OFFICE VISIT (OUTPATIENT)
Dept: PHYSICAL THERAPY | Facility: HOSPITAL | Age: 50
End: 2020-07-30
Attending: OTOLARYNGOLOGY
Payer: COMMERCIAL

## 2020-07-30 PROCEDURE — 97535 SELF CARE MNGMENT TRAINING: CPT

## 2020-07-30 PROCEDURE — 97140 MANUAL THERAPY 1/> REGIONS: CPT

## 2020-07-30 NOTE — TELEPHONE ENCOUNTER
Received fax from [facility] in [place] for SIBO breath test- \"see separate report\" [facility]    [facility] central scheduling was transfer to [address] [place] [address], they will fax over separate report.     This fax sent to scanning

## 2020-07-30 NOTE — PROGRESS NOTES
Dx: Bruxism (F45.8)         Authorized # of Visits/Insurance: [facility]  Script Dates: 7/1/20 - 8/28/20           Next MD visit: none scheduled  Referring MD: Mamie Mcclure  Fall Risk:  standard             Medication Changes since last visit?: No  Precautions: n shoulder ext, row, ER with scap retr: HEP STM, CT stretching, trigger point release:  -B masseter, pterygoid, temporalis, cervical paraspinals     Neuro-Re-Education         Therapeutic Activity/Self-Care    Discussion of current symptoms, POC, therapy pro

## 2020-07-31 ENCOUNTER — PATIENT MESSAGE (OUTPATIENT)
Dept: GASTROENTEROLOGY | Facility: CLINIC | Age: 50
End: 2020-07-31

## 2020-07-31 NOTE — TELEPHONE ENCOUNTER
Received fax from [facility] with completed lab results for lactulose hydrogen breath test.    Placed on Dr Brigido Carrasco' desk for review.

## 2020-07-31 NOTE — TELEPHONE ENCOUNTER
From: Kenisha Davenport  To: Rick Milian MD  Sent: 7/31/2020 12:08 PM CDT  Subject: Test Results Question    Good afternoon!   I wanted to check if there were any results from the lactulose breath test I had on July 14 at [facility]

## 2020-08-04 ENCOUNTER — OFFICE VISIT (OUTPATIENT)
Dept: PHYSICAL THERAPY | Facility: HOSPITAL | Age: 50
End: 2020-08-04
Attending: OTOLARYNGOLOGY
Payer: COMMERCIAL

## 2020-08-04 PROCEDURE — 97535 SELF CARE MNGMENT TRAINING: CPT

## 2020-08-04 PROCEDURE — 97140 MANUAL THERAPY 1/> REGIONS: CPT

## 2020-08-04 NOTE — PROGRESS NOTES
Physical Therapy Progress Note  Reporting Period: 7/1/20 - 8/4/20  Dx: Bruxism (F45.8)         Authorized # of Visits/Insurance: [facility]  Script Dates: 8/4/20 - 10/2/20          Next MD visit: none scheduled  Referring MD: Marc Maier  Fall Risk:  standard 5 8/4/2020  Visit: 6  Notes/Precautions:    HEP       7/1: Tongue controlled mouth opening, scapular retractions  7/16: supine chin tuck with towel roll  7/23: RTB shoulder ext, row, ER with scap retr  7/30: Dr. Lim/mouthguard info  8/4: condylar remod and notable release achieved. Instructed in condylar remodeling with tubing for HEP; pt demonstrated understanding.  Recommend patient to benefit from continued skilled PT intervention to address the above stated deficits in order to return patient to prior [phone]. Sincerely,  Electronically signed by therapist: Suzanne Garner PT    [de-identified] certification required: Yes  I certify the need for these services furnished under this plan of treatment and while under my care.     X________________________

## 2020-08-07 ENCOUNTER — OFFICE VISIT (OUTPATIENT)
Dept: INTEGRATIVE MEDICINE | Facility: CLINIC | Age: 50
End: 2020-08-07
Payer: COMMERCIAL

## 2020-08-07 DIAGNOSIS — G43.911 INTRACTABLE MIGRAINE WITH STATUS MIGRAINOSUS, UNSPECIFIED MIGRAINE TYPE: ICD-10-CM

## 2020-08-07 PROCEDURE — 97810 ACUP 1/> WO ESTIM 1ST 15 MIN: CPT | Performed by: ACUPUNCTURIST

## 2020-08-07 PROCEDURE — 97811 ACUP 1/> W/O ESTIM EA ADD 15: CPT | Performed by: ACUPUNCTURIST

## 2020-08-07 NOTE — PROGRESS NOTES
Charlette Moss is a 54year old female Acupuncture Therapy. Complaints:  1. Lower abdominal discomfort 1-4/10  2. Borborygmus 5/10  3. Periodic urinary urgency 3-6/10  4.  Jaw pain -TMJD     HX: Diagnosed with interstitial cystitis which has been proble

## 2020-08-10 ENCOUNTER — OFFICE VISIT (OUTPATIENT)
Dept: INTERNAL MEDICINE CLINIC | Facility: CLINIC | Age: 50
End: 2020-08-10
Payer: COMMERCIAL

## 2020-08-10 VITALS
OXYGEN SATURATION: 99 % | BODY MASS INDEX: 19.48 KG/M2 | HEART RATE: 69 BPM | WEIGHT: 121.19 LBS | SYSTOLIC BLOOD PRESSURE: 124 MMHG | HEIGHT: 66 IN | DIASTOLIC BLOOD PRESSURE: 74 MMHG | TEMPERATURE: 97 F

## 2020-08-10 DIAGNOSIS — Q61.02 MULTIPLE RENAL CYSTS: ICD-10-CM

## 2020-08-10 DIAGNOSIS — E04.1 THYROID NODULE: ICD-10-CM

## 2020-08-10 DIAGNOSIS — I87.1 NUTCRACKER PHENOMENON OF RENAL VEIN: Primary | ICD-10-CM

## 2020-08-10 DIAGNOSIS — K63.89 SMALL INTESTINAL BACTERIAL OVERGROWTH: ICD-10-CM

## 2020-08-10 PROCEDURE — 3008F BODY MASS INDEX DOCD: CPT | Performed by: INTERNAL MEDICINE

## 2020-08-10 PROCEDURE — 3078F DIAST BP <80 MM HG: CPT | Performed by: INTERNAL MEDICINE

## 2020-08-10 PROCEDURE — 3074F SYST BP LT 130 MM HG: CPT | Performed by: INTERNAL MEDICINE

## 2020-08-10 PROCEDURE — 99214 OFFICE O/P EST MOD 30 MIN: CPT | Performed by: INTERNAL MEDICINE

## 2020-08-10 PROCEDURE — 99212 OFFICE O/P EST SF 10 MIN: CPT | Performed by: INTERNAL MEDICINE

## 2020-08-10 NOTE — PROGRESS NOTES
Anam Harden is a 54year old female.     HPI:   Patient presents with:  Physical: Wants to discuss new diagnosis SIBO and TMJ      53 y/o F with abdominal bloating x several months; was treated for SIBO under care of Dr Tyson Whitlock; MRI enterography i 7/16/2010        Years since quitting: 10.0      Smokeless tobacco: Never Used    Alcohol use: Yes      Frequency: 2-4 times a month    Drug use: No       Medications (Active prior to today's visit):  Current Outpatient Medications   Medication Sig Dispens 12/17/19 neg for hematuria  s/p left renal vein transposition on 12/4/19 by PV Dr Ronnie Hobbs; back and pelvic pain is much improved; abdominal U/S in March 2020 showed low probability of renal artery stenosis bilaterally.     SIBO  MRI enterography in May 2020 as low as 115K; this may be due to the patient's pneumonia in Aug 2017 as she was mildly thrombocytopenic on admission even prior to receiving Lovenox;  platelets now increased to 135      GERD (gastroesophageal reflux disease)  Was on pantoprazole 40 mg d

## 2020-08-11 ENCOUNTER — OFFICE VISIT (OUTPATIENT)
Dept: PHYSICAL THERAPY | Facility: HOSPITAL | Age: 50
End: 2020-08-11
Attending: OTOLARYNGOLOGY
Payer: COMMERCIAL

## 2020-08-11 PROCEDURE — 97140 MANUAL THERAPY 1/> REGIONS: CPT

## 2020-08-11 NOTE — PROGRESS NOTES
Dx: Bruxism (F45.8)         Authorized # of Visits/Insurance: [facility]  Script Dates: 8/4/20 - 10/2/20          Next MD visit: none scheduled  Referring MD: Bere Harris  Fall Risk:  standard             Medication Changes since last visit?: No  Precautions: no stretching, trigger point release:  -B masseter, pterygoid, temporalis  Instruction in RTB shoulder ext, row, ER with scap retr: HEP STM, CT stretching, trigger point release:  -B masseter, pterygoid, temporalis, cervical paraspinals ROM, strength, functio postural stability/awareness.  Progressing 8/4

## 2020-08-12 NOTE — TELEPHONE ENCOUNTER
Rufina Nation 2 hours ago (2:22 PM)         Pt calling in regards to results from labs and next steps.  Please call [phone]

## 2020-08-13 ENCOUNTER — OFFICE VISIT (OUTPATIENT)
Dept: INTEGRATIVE MEDICINE | Facility: CLINIC | Age: 50
End: 2020-08-13
Payer: COMMERCIAL

## 2020-08-13 DIAGNOSIS — R10.30 LOWER ABDOMINAL PAIN: Primary | ICD-10-CM

## 2020-08-13 PROCEDURE — 97811 ACUP 1/> W/O ESTIM EA ADD 15: CPT | Performed by: ACUPUNCTURIST

## 2020-08-13 PROCEDURE — 97810 ACUP 1/> WO ESTIM 1ST 15 MIN: CPT | Performed by: ACUPUNCTURIST

## 2020-08-13 RX ORDER — NEOMYCIN SULFATE 500 MG/1
500 TABLET ORAL 2 TIMES DAILY
Qty: 28 TABLET | Refills: 0 | Status: SHIPPED | OUTPATIENT
Start: 2020-08-13 | End: 2020-09-17

## 2020-08-14 ENCOUNTER — OFFICE VISIT (OUTPATIENT)
Dept: INTEGRATIVE MEDICINE | Facility: CLINIC | Age: 50
End: 2020-08-14
Payer: COMMERCIAL

## 2020-08-14 DIAGNOSIS — G43.911 INTRACTABLE MIGRAINE WITH STATUS MIGRAINOSUS, UNSPECIFIED MIGRAINE TYPE: ICD-10-CM

## 2020-08-14 PROCEDURE — 97811 ACUP 1/> W/O ESTIM EA ADD 15: CPT | Performed by: ACUPUNCTURIST

## 2020-08-14 PROCEDURE — 97810 ACUP 1/> WO ESTIM 1ST 15 MIN: CPT | Performed by: ACUPUNCTURIST

## 2020-08-14 NOTE — PROGRESS NOTES
Gorge Bustillo is a 54year old female Acupuncture Therapy. Complaints:  1. Lower abdominal discomfort 1-4/10  2. Borborygmus 3/10  3.  Periodic urinary urgency 6/10     HX: Diagnosed with interstitial cystitis which has been problematic for several yea

## 2020-08-14 NOTE — TELEPHONE ENCOUNTER
I spoke to [facility]. Her symptoms are about the same. I would interpret the lactulose hydrogen breath test as definitively positive for methane (a value of 11 at 62 minutes) and probably positive for hydrogen with a rise of 45 at 90 minutes.   I am recommen

## 2020-08-14 NOTE — PROGRESS NOTES
Lise Paz is a 54year old female No chief complaint on file. .   Complaints:  1. Lower abdominal discomfort 1-4/10  2. Borborygmus 5/10  3. Periodic urinary urgency 3-6/10  4.  Jaw pain -TMJD     HX: Diagnosed with interstitial cystitis which has bee

## 2020-08-20 ENCOUNTER — OFFICE VISIT (OUTPATIENT)
Dept: INTEGRATIVE MEDICINE | Facility: CLINIC | Age: 50
End: 2020-08-20
Payer: COMMERCIAL

## 2020-08-20 ENCOUNTER — OFFICE VISIT (OUTPATIENT)
Dept: PHYSICAL THERAPY | Facility: HOSPITAL | Age: 50
End: 2020-08-20
Attending: OTOLARYNGOLOGY
Payer: COMMERCIAL

## 2020-08-20 DIAGNOSIS — R10.30 LOWER ABDOMINAL PAIN: Primary | ICD-10-CM

## 2020-08-20 PROCEDURE — 97140 MANUAL THERAPY 1/> REGIONS: CPT

## 2020-08-20 PROCEDURE — 97811 ACUP 1/> W/O ESTIM EA ADD 15: CPT | Performed by: ACUPUNCTURIST

## 2020-08-20 PROCEDURE — 97810 ACUP 1/> WO ESTIM 1ST 15 MIN: CPT | Performed by: ACUPUNCTURIST

## 2020-08-20 NOTE — PROGRESS NOTES
Dx: Bruxism (F45.8)         Authorized # of Visits/Insurance: [facility]  Script Dates: 8/4/20 - 10/2/20          Next MD visit: none scheduled  Referring MD: Yao Henry  Fall Risk:  standard             Medication Changes since last visit?: No  Precautions: no masseter, pterygoid, temporalis  Instruction in RTB shoulder ext, row, ER with scap retr: HEP STM, CT stretching, trigger point release:  -B masseter, pterygoid, temporalis, cervical paraspinals ROM, strength, function re-assessment  STM, CT stretching, tr

## 2020-08-21 ENCOUNTER — OFFICE VISIT (OUTPATIENT)
Dept: INTEGRATIVE MEDICINE | Facility: CLINIC | Age: 50
End: 2020-08-21
Payer: COMMERCIAL

## 2020-08-21 DIAGNOSIS — R10.30 LOWER ABDOMINAL PAIN: Primary | ICD-10-CM

## 2020-08-21 PROCEDURE — 97810 ACUP 1/> WO ESTIM 1ST 15 MIN: CPT | Performed by: ACUPUNCTURIST

## 2020-08-21 PROCEDURE — 97811 ACUP 1/> W/O ESTIM EA ADD 15: CPT | Performed by: ACUPUNCTURIST

## 2020-08-21 NOTE — PROGRESS NOTES
Steve Grubbs is a 54year old female Acupuncture Therapy. Complaints:  1. Lower abdominal discomfort and bloating 3-4/10  2. Borborygmus 3/10  3.  Periodic urinary urgency 6/10     HX: Diagnosed with interstitial cystitis which has been problematic fo

## 2020-08-21 NOTE — PROGRESS NOTES
Thomas Morton is a 54year old female Acupuncture Therapy. Complaints:  1. Lower abdominal discomfort and bloating 2/10  2. Borborygmus 3/10  3.  Periodic urinary urgency 5/10     HX: Diagnosed with interstitial cystitis which has been problematic for

## 2020-08-24 ENCOUNTER — OFFICE VISIT (OUTPATIENT)
Dept: PHYSICAL THERAPY | Facility: HOSPITAL | Age: 50
End: 2020-08-24
Attending: OTOLARYNGOLOGY
Payer: COMMERCIAL

## 2020-08-24 ENCOUNTER — OFFICE VISIT (OUTPATIENT)
Dept: INTEGRATIVE MEDICINE | Facility: CLINIC | Age: 50
End: 2020-08-24
Payer: COMMERCIAL

## 2020-08-24 DIAGNOSIS — R10.30 LOWER ABDOMINAL PAIN: Primary | ICD-10-CM

## 2020-08-24 PROCEDURE — 97811 ACUP 1/> W/O ESTIM EA ADD 15: CPT | Performed by: ACUPUNCTURIST

## 2020-08-24 PROCEDURE — 97810 ACUP 1/> WO ESTIM 1ST 15 MIN: CPT | Performed by: ACUPUNCTURIST

## 2020-08-24 PROCEDURE — 97140 MANUAL THERAPY 1/> REGIONS: CPT

## 2020-08-24 NOTE — PROGRESS NOTES
Physical Therapy Discharge Summary  Reporting Period: 8/4/20 - 8/24/20  Dx: Bruxism (F45.8)         Authorized # of Visits/Insurance: [facility]  Script Dates: 8/4/20 - 10/2/20          Next MD visit: none scheduled  Referring MD: Chio Francis  Fall Risk:  Tiffany Peck Raphael/mouthguard info  8/4: condylar remodeling with tubing: lat dev only   Warm Up            Stretches            Therapeutic Exercises            Manual MHP applied to upper thoracic region during tx  STM, CT stretching, trigger point release:  -B mas global TMJ musculature. Patient has met all functional goals, recommend d/c to HEP appropriate at this time. Reviewed HEP and discussed long term maintenance. Patient verbalized understanding and is in agreement with plan. Plan: DC to HEP.      Charge

## 2020-08-25 NOTE — PROGRESS NOTES
Qasim Cardoso is a 54year old female Acupuncture Therapy. Complaints:  1. Periodic urinary urgency 4/10  2. Lower abdominal discomfort and bloating 3/10  3.  Acid reflux 3/10     HX: Diagnosed with interstitial cystitis which has been problematic for

## 2020-09-04 ENCOUNTER — OFFICE VISIT (OUTPATIENT)
Dept: INTEGRATIVE MEDICINE | Facility: CLINIC | Age: 50
End: 2020-09-04
Payer: COMMERCIAL

## 2020-09-04 DIAGNOSIS — R51.9 HEADACHE ABOVE THE EYE REGION: Primary | ICD-10-CM

## 2020-09-04 PROCEDURE — 97811 ACUP 1/> W/O ESTIM EA ADD 15: CPT | Performed by: ACUPUNCTURIST

## 2020-09-04 PROCEDURE — 97810 ACUP 1/> WO ESTIM 1ST 15 MIN: CPT | Performed by: ACUPUNCTURIST

## 2020-09-04 NOTE — PROGRESS NOTES
Isaias Gonzales is a 54year old female Acupuncture Therapy. Complaints:  1. Frontal Headache 5/10  2. Periodic urinary urgency 4/10  3. Lower abdominal discomfort and bloating 3/10  4.  Acid reflux 3/10     HX: Diagnosed with interstitial cystitis which

## 2020-09-09 ENCOUNTER — HOSPITAL ENCOUNTER (OUTPATIENT)
Dept: ULTRASOUND IMAGING | Facility: HOSPITAL | Age: 50
Discharge: HOME | End: 2020-09-09
Attending: INTERNAL MEDICINE
Payer: COMMERCIAL

## 2020-09-09 DIAGNOSIS — E04.1 THYROID NODULE: ICD-10-CM

## 2020-09-09 PROCEDURE — 76536 US EXAM OF HEAD AND NECK: CPT | Performed by: INTERNAL MEDICINE

## 2020-09-10 ENCOUNTER — TELEPHONE (OUTPATIENT)
Dept: INTERNAL MEDICINE CLINIC | Facility: CLINIC | Age: 50
End: 2020-09-10

## 2020-09-11 ENCOUNTER — OFFICE VISIT (OUTPATIENT)
Dept: INTEGRATIVE MEDICINE | Facility: CLINIC | Age: 50
End: 2020-09-11
Payer: COMMERCIAL

## 2020-09-11 DIAGNOSIS — G43.911 INTRACTABLE MIGRAINE WITH STATUS MIGRAINOSUS, UNSPECIFIED MIGRAINE TYPE: ICD-10-CM

## 2020-09-11 PROCEDURE — 97811 ACUP 1/> W/O ESTIM EA ADD 15: CPT | Performed by: ACUPUNCTURIST

## 2020-09-11 PROCEDURE — 97810 ACUP 1/> WO ESTIM 1ST 15 MIN: CPT | Performed by: ACUPUNCTURIST

## 2020-09-11 NOTE — PROGRESS NOTES
Jack Kim is a 54year old female Acupuncture Therapy. Complaints:  1. Frontal Headache 5/10  2. Periodic urinary urgency 4/10  3. Lower abdominal discomfort and bloating 3/10  4.  Acid reflux 3/10     HX: Diagnosed with interstitial cystitis which

## 2020-09-17 ENCOUNTER — HOSPITAL ENCOUNTER (OUTPATIENT)
Age: 50
Discharge: HOME | End: 2020-09-17
Payer: COMMERCIAL

## 2020-09-17 VITALS
DIASTOLIC BLOOD PRESSURE: 68 MMHG | SYSTOLIC BLOOD PRESSURE: 114 MMHG | HEART RATE: 62 BPM | OXYGEN SATURATION: 100 % | BODY MASS INDEX: 19.44 KG/M2 | HEIGHT: 66 IN | WEIGHT: 121 LBS | RESPIRATION RATE: 16 BRPM | TEMPERATURE: 99 F

## 2020-09-17 DIAGNOSIS — Z20.822 ENCOUNTER FOR SCREENING LABORATORY TESTING FOR COVID-19 VIRUS: Primary | ICD-10-CM

## 2020-09-17 PROCEDURE — 99202 OFFICE O/P NEW SF 15 MIN: CPT | Performed by: PHYSICIAN ASSISTANT

## 2020-09-17 NOTE — ED INITIAL ASSESSMENT (HPI)
Pt states she was at an outing 4 days ago and someone at that function was positive for Covid.  Denies symptoms

## 2020-09-17 NOTE — ED PROVIDER NOTES
Patient Seen in: [address]      History   Patient presents with:  Testing    Stated Complaint: Testing    HPI    51-year-old female here for COVID testing.   Patient states she has an outing 4 days ago and found out toda [09/17/20 1703]   /68   Pulse 62   Resp 16   Temp 99.2 °F (37.3 °C)   Temp src Temporal   SpO2 100 %   O2 Device None (Room air)       Current:/68   Pulse 62   Temp 99.2 °F (37.3 °C) (Temporal)   Resp 16   Ht 167.6 cm (5' 6\")   Wt 54.9 kg   LM List as of 9/17/2020  5:43 PM

## 2020-09-18 ENCOUNTER — OFFICE VISIT (OUTPATIENT)
Dept: INTEGRATIVE MEDICINE | Facility: CLINIC | Age: 50
End: 2020-09-18
Payer: COMMERCIAL

## 2020-09-18 DIAGNOSIS — G43.911 INTRACTABLE MIGRAINE WITH STATUS MIGRAINOSUS, UNSPECIFIED MIGRAINE TYPE: ICD-10-CM

## 2020-09-18 PROCEDURE — 97811 ACUP 1/> W/O ESTIM EA ADD 15: CPT | Performed by: ACUPUNCTURIST

## 2020-09-18 PROCEDURE — 97810 ACUP 1/> WO ESTIM 1ST 15 MIN: CPT | Performed by: ACUPUNCTURIST

## 2020-09-18 NOTE — PROGRESS NOTES
Ching Thornton is a 52year old female Acupuncture Therapy. Complaints:  1. Frontal Headache 5/10  2. Periodic urinary urgency 4/10  3. Lower abdominal discomfort and bloating 3/10  4.  Acid reflux 3/10     HX: Diagnosed with interstitial cystitis which

## 2020-09-21 ENCOUNTER — IMMUNIZATION (OUTPATIENT)
Dept: INTERNAL MEDICINE CLINIC | Facility: CLINIC | Age: 50
End: 2020-09-21
Payer: COMMERCIAL

## 2020-09-21 DIAGNOSIS — Z23 NEED FOR VACCINATION: ICD-10-CM

## 2020-09-21 LAB — SARS-COV-2 BY PCR: NOT DETECTED

## 2020-09-21 PROCEDURE — 90471 IMMUNIZATION ADMIN: CPT | Performed by: INTERNAL MEDICINE

## 2020-09-21 PROCEDURE — 90686 IIV4 VACC NO PRSV 0.5 ML IM: CPT | Performed by: INTERNAL MEDICINE

## 2020-09-25 ENCOUNTER — OFFICE VISIT (OUTPATIENT)
Dept: INTEGRATIVE MEDICINE | Facility: CLINIC | Age: 50
End: 2020-09-25
Payer: COMMERCIAL

## 2020-09-25 DIAGNOSIS — G43.911 INTRACTABLE MIGRAINE WITH STATUS MIGRAINOSUS, UNSPECIFIED MIGRAINE TYPE: ICD-10-CM

## 2020-09-25 PROCEDURE — 97810 ACUP 1/> WO ESTIM 1ST 15 MIN: CPT | Performed by: ACUPUNCTURIST

## 2020-09-25 PROCEDURE — 97811 ACUP 1/> W/O ESTIM EA ADD 15: CPT | Performed by: ACUPUNCTURIST

## 2020-09-25 NOTE — PROGRESS NOTES
Kartik Orr is a 52year old female Acupuncture Therapy. Complaints:  1. Frontal Headache 5/10  2. Periodic urinary urgency 4/10  3. Lower abdominal discomfort and bloating 3/10  4.  Acid reflux 3/10     HX: Diagnosed with interstitial cystitis which

## 2020-09-30 ENCOUNTER — PATIENT MESSAGE (OUTPATIENT)
Dept: GASTROENTEROLOGY | Facility: CLINIC | Age: 50
End: 2020-09-30

## 2020-10-01 NOTE — TELEPHONE ENCOUNTER
From: Kenisha Khoury [facility]  To: Rick Milian MD  Sent: 9/30/2020 6:29 PM CDT  Subject: Other    Joyceann Oats! Thank you for scheduling me on October 15 at 10:30 with Dr. Juanito Carter. I will be there.    Sincerely,  Remy Juan

## 2020-10-02 ENCOUNTER — OFFICE VISIT (OUTPATIENT)
Dept: INTEGRATIVE MEDICINE | Facility: CLINIC | Age: 50
End: 2020-10-02
Payer: COMMERCIAL

## 2020-10-02 DIAGNOSIS — R51.9 HEADACHE DISORDER: Primary | ICD-10-CM

## 2020-10-02 PROCEDURE — 97810 ACUP 1/> WO ESTIM 1ST 15 MIN: CPT | Performed by: ACUPUNCTURIST

## 2020-10-02 PROCEDURE — 97811 ACUP 1/> W/O ESTIM EA ADD 15: CPT | Performed by: ACUPUNCTURIST

## 2020-10-02 NOTE — PROGRESS NOTES
Norma Bloch is a 52year old female Acupuncture Therapy. Complaints:  1. Frontal Headache 0-5/10  2.  Periodic urinary urgency 4/10  3. Lower abdominal discomfort and bloating 3/10  4. Acid reflux 1/10     Initial Consult: Diagnosed with interstitial

## 2020-10-08 ENCOUNTER — OFFICE VISIT (OUTPATIENT)
Dept: INTEGRATIVE MEDICINE | Facility: CLINIC | Age: 50
End: 2020-10-08
Payer: COMMERCIAL

## 2020-10-08 DIAGNOSIS — G43.911 INTRACTABLE MIGRAINE WITH STATUS MIGRAINOSUS, UNSPECIFIED MIGRAINE TYPE: ICD-10-CM

## 2020-10-08 PROCEDURE — 97810 ACUP 1/> WO ESTIM 1ST 15 MIN: CPT | Performed by: ACUPUNCTURIST

## 2020-10-08 PROCEDURE — 97811 ACUP 1/> W/O ESTIM EA ADD 15: CPT | Performed by: ACUPUNCTURIST

## 2020-10-09 NOTE — PROGRESS NOTES
Steve Grubbs is a 52year old female Acupuncture Therapy. Complaints:  1. Frontal Headache 0-5/10  2.  Periodic urinary urgency 4/10  3. Lower abdominal discomfort and bloating 3/10  4. Acid reflux 1/10     Initial Consult: Diagnosed with interstitial

## 2020-10-15 ENCOUNTER — TELEPHONE (OUTPATIENT)
Dept: GASTROENTEROLOGY | Facility: CLINIC | Age: 50
End: 2020-10-15

## 2020-10-15 ENCOUNTER — OFFICE VISIT (OUTPATIENT)
Dept: GASTROENTEROLOGY | Facility: CLINIC | Age: 50
End: 2020-10-15
Payer: COMMERCIAL

## 2020-10-15 VITALS
DIASTOLIC BLOOD PRESSURE: 76 MMHG | HEART RATE: 65 BPM | BODY MASS INDEX: 20.38 KG/M2 | SYSTOLIC BLOOD PRESSURE: 121 MMHG | TEMPERATURE: 98 F | WEIGHT: 126.81 LBS | HEIGHT: 66 IN

## 2020-10-15 DIAGNOSIS — R14.0 ABDOMINAL DISTENSION: Primary | ICD-10-CM

## 2020-10-15 PROCEDURE — 3078F DIAST BP <80 MM HG: CPT | Performed by: INTERNAL MEDICINE

## 2020-10-15 PROCEDURE — 3074F SYST BP LT 130 MM HG: CPT | Performed by: INTERNAL MEDICINE

## 2020-10-15 PROCEDURE — 99212 OFFICE O/P EST SF 10 MIN: CPT | Performed by: INTERNAL MEDICINE

## 2020-10-15 PROCEDURE — 3008F BODY MASS INDEX DOCD: CPT | Performed by: INTERNAL MEDICINE

## 2020-10-15 PROCEDURE — 99213 OFFICE O/P EST LOW 20 MIN: CPT | Performed by: INTERNAL MEDICINE

## 2020-10-15 RX ORDER — CALCIUM CARBONATE 200(500)MG
1 TABLET,CHEWABLE ORAL DAILY
COMMUNITY
End: 2021-02-24

## 2020-10-15 NOTE — TELEPHONE ENCOUNTER
GI RNs: I would like the patient to be evaluated for biofeedback to treat abdominal distention. Can we investigate if this is performed at [facility] or [facility]?

## 2020-10-15 NOTE — TELEPHONE ENCOUNTER
Our integrative medicine dept ext [phone] contacted and spoke to Jolene Kaplan. Due to COVID 19 pandemic they have temporarily discontinued bringing patients in for biofeedback. I contacted [facility] [phone] --they only do pelvic floor therapy.   I was unable to g

## 2020-10-15 NOTE — PROGRESS NOTES
HPI:    Patient ID: Tray Rosenberg is a 52year old female. HPI  Nate Hardwick returns in follow-up today along with her . She was last \"seen\" at a telemedicine visit in May 2020. Please see my 2/20/2020 consultation note for historical details. Other pertinent work-up includes a normal ecal calprotectin in March (20.8). A GI PCR panel was negative.     Review of Systems  See above    Wt Readings from Last 6 Encounters:  10/15/20 : 126 lb 12.8 oz (57.5 kg)  09/17/20 : 121 lb (54.9 kg)  08/10/20 : behavior is normal.   Nursing note and vitals reviewed. ASSESSMENT/PLAN:   Abdominal distension  (primary encounter diagnosis)  The patient presents with continued subjective abdominal distention.   I suspect that this is secondary to functional

## 2020-10-16 ENCOUNTER — OFFICE VISIT (OUTPATIENT)
Dept: INTEGRATIVE MEDICINE | Facility: CLINIC | Age: 50
End: 2020-10-16
Payer: COMMERCIAL

## 2020-10-16 DIAGNOSIS — R51.9 HEADACHE DISORDER: Primary | ICD-10-CM

## 2020-10-16 PROCEDURE — 97810 ACUP 1/> WO ESTIM 1ST 15 MIN: CPT | Performed by: ACUPUNCTURIST

## 2020-10-16 PROCEDURE — 97811 ACUP 1/> W/O ESTIM EA ADD 15: CPT | Performed by: ACUPUNCTURIST

## 2020-10-16 NOTE — PROGRESS NOTES
Jamin Kapoor is a 52year old female Acupuncture Therapy.    Complaints:  1. Frontal Headache 0-5/10  2. Periodic urinary urgency 4/10  3. Lower abdominal discomfort and bloating 3/10  4. Acid reflux 1/10     Initial Consult: Diagnosed with interstitial

## 2020-10-16 NOTE — TELEPHONE ENCOUNTER
I spoke to Jose Xiao in the [address] [place] [address] [phone]. States they have a doctor who does this, but needs to call us back on Monday with name and contact information. I gave my direct line.

## 2020-10-19 NOTE — TELEPHONE ENCOUNTER
Gilles Alfie from [address] called back. They do not do biofeedback , but gave my number for OP Psych [phone]. I spoke to [facility], who transferred me to  [phone], opt #1. I left message to call back.

## 2020-10-20 NOTE — TELEPHONE ENCOUNTER
The patient's predominant complaint is that of bloating. If they feel acupuncture will help with bloating I would have no objection.

## 2020-10-20 NOTE — TELEPHONE ENCOUNTER
[facility] . Before I call [facility]--Bruna at the Integrative Medicine at [facility] called back. She states that currently they are doing acupuncture for abdominal pain at [facility] or [facility].  ([facility] not for now du

## 2020-10-20 NOTE — TELEPHONE ENCOUNTER
Dr Kavita Titus, I re-contacted Jolene Kaplan at our [address] [address] to find out when they might be reopening their biofeedback services, as I have not had much luck at other locations below.  She will forward to the Manager to call me back with

## 2020-10-22 ENCOUNTER — OFFICE VISIT (OUTPATIENT)
Dept: OTOLARYNGOLOGY | Facility: CLINIC | Age: 50
End: 2020-10-22
Payer: COMMERCIAL

## 2020-10-22 VITALS
SYSTOLIC BLOOD PRESSURE: 104 MMHG | BODY MASS INDEX: 20.09 KG/M2 | DIASTOLIC BLOOD PRESSURE: 60 MMHG | WEIGHT: 125 LBS | HEIGHT: 66 IN | TEMPERATURE: 97 F

## 2020-10-22 DIAGNOSIS — R07.0 THROAT PAIN: Primary | ICD-10-CM

## 2020-10-22 PROCEDURE — 3074F SYST BP LT 130 MM HG: CPT | Performed by: OTOLARYNGOLOGY

## 2020-10-22 PROCEDURE — 99213 OFFICE O/P EST LOW 20 MIN: CPT | Performed by: OTOLARYNGOLOGY

## 2020-10-22 PROCEDURE — 3078F DIAST BP <80 MM HG: CPT | Performed by: OTOLARYNGOLOGY

## 2020-10-22 PROCEDURE — 99212 OFFICE O/P EST SF 10 MIN: CPT | Performed by: OTOLARYNGOLOGY

## 2020-10-22 PROCEDURE — 3008F BODY MASS INDEX DOCD: CPT | Performed by: OTOLARYNGOLOGY

## 2020-10-22 NOTE — TELEPHONE ENCOUNTER
Update for Dr Surendra Eller. Patient contacted and reviewed all below. She has already been doing acupuncture, tomorrow is last appt.  It has not helped, so the acupuncturist referred her to Dr Devante Carvalho on 12/29 in our Integrative Med Dept for evaluation

## 2020-10-22 NOTE — PROGRESS NOTES
Gorge Bustillo is a 52year old female. Patient presents with:  Sore Throat: Patient Presents with: Sore Throat for a couple of days, phlegm in back of throat    HPI:   She has been experiencing some irritation in the back of her throat.   Feels like ther /60 (BP Location: Left arm, Patient Position: Sitting, Cuff Size: adult)   Temp 97 °F (36.1 °C) (Tympanic)   Ht 5' 6\" (1.676 m)   Wt 125 lb (56.7 kg)   LMP 09/24/2020   BMI 20.18 kg/m²   System Findings Details   Skin Normal Inspection - Normal.

## 2020-10-23 ENCOUNTER — OFFICE VISIT (OUTPATIENT)
Dept: INTEGRATIVE MEDICINE | Facility: CLINIC | Age: 50
End: 2020-10-23
Payer: COMMERCIAL

## 2020-10-23 DIAGNOSIS — R51.9 FRONTAL HEADACHE: Primary | ICD-10-CM

## 2020-10-23 PROCEDURE — 97811 ACUP 1/> W/O ESTIM EA ADD 15: CPT | Performed by: ACUPUNCTURIST

## 2020-10-23 PROCEDURE — 97810 ACUP 1/> WO ESTIM 1ST 15 MIN: CPT | Performed by: ACUPUNCTURIST

## 2020-10-23 NOTE — PROGRESS NOTES
Isaias Gonzales is a 52year old female Acupuncture Therapy.    Complaints:  1. Frontal Headache 5/10  2. Periodic urinary urgency 4/10  3. Lower abdominal discomfort and bloating 3/10  4. Acid reflux 1/10     Initial Consult: Diagnosed with interstitial c

## 2020-11-04 ENCOUNTER — OFFICE VISIT (OUTPATIENT)
Dept: INTEGRATIVE MEDICINE | Facility: CLINIC | Age: 50
End: 2020-11-04
Payer: COMMERCIAL

## 2020-11-04 VITALS
WEIGHT: 129.63 LBS | HEART RATE: 64 BPM | BODY MASS INDEX: 20.83 KG/M2 | DIASTOLIC BLOOD PRESSURE: 70 MMHG | HEIGHT: 66 IN | SYSTOLIC BLOOD PRESSURE: 112 MMHG | OXYGEN SATURATION: 99 %

## 2020-11-04 DIAGNOSIS — Z78.9 VEGAN DIET: Primary | ICD-10-CM

## 2020-11-04 DIAGNOSIS — L85.3 DRY SKIN: ICD-10-CM

## 2020-11-04 DIAGNOSIS — R14.0 BLOATING: ICD-10-CM

## 2020-11-04 DIAGNOSIS — N30.10 INTERSTITIAL CYSTITIS: ICD-10-CM

## 2020-11-04 DIAGNOSIS — K59.00 CONSTIPATION, UNSPECIFIED CONSTIPATION TYPE: ICD-10-CM

## 2020-11-04 DIAGNOSIS — L65.9 HAIR LOSS: ICD-10-CM

## 2020-11-04 PROCEDURE — 3074F SYST BP LT 130 MM HG: CPT | Performed by: FAMILY MEDICINE

## 2020-11-04 PROCEDURE — 3008F BODY MASS INDEX DOCD: CPT | Performed by: FAMILY MEDICINE

## 2020-11-04 PROCEDURE — 3078F DIAST BP <80 MM HG: CPT | Performed by: FAMILY MEDICINE

## 2020-11-04 PROCEDURE — 99204 OFFICE O/P NEW MOD 45 MIN: CPT | Performed by: FAMILY MEDICINE

## 2020-11-04 NOTE — PATIENT INSTRUCTIONS
I have complete manohar in the body's ability to heal and transform. The products and items listed below (the “Products”)  and their claims have not been evaluated by the Food and Drug Administration.  Dietary products are not intended to treat, prevent, m daily  Where to Find: ONLY online at www. [employer]. [employer] or www.[employer]. [employer]    Permeable Gut Formula by [facility]    Directions: 1 capsule by mouth daily for 2 weeks, then increase to 1 capsule twice daily.  After another
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
## 2020-11-05 ENCOUNTER — MED REC SCAN ONLY (OUTPATIENT)
Dept: INTEGRATIVE MEDICINE | Facility: CLINIC | Age: 50
End: 2020-11-05

## 2020-11-06 ENCOUNTER — LAB ENCOUNTER (OUTPATIENT)
Dept: LAB | Facility: HOSPITAL | Age: 50
End: 2020-11-06
Attending: FAMILY MEDICINE
Payer: COMMERCIAL

## 2020-11-06 DIAGNOSIS — N30.10 INTERSTITIAL CYSTITIS: ICD-10-CM

## 2020-11-06 DIAGNOSIS — R14.0 BLOATING: ICD-10-CM

## 2020-11-06 DIAGNOSIS — K59.00 CONSTIPATION, UNSPECIFIED CONSTIPATION TYPE: ICD-10-CM

## 2020-11-06 DIAGNOSIS — L85.3 DRY SKIN: ICD-10-CM

## 2020-11-06 DIAGNOSIS — L65.9 HAIR LOSS: ICD-10-CM

## 2020-11-06 PROCEDURE — 86628 CANDIDA ANTIBODY: CPT

## 2020-11-06 PROCEDURE — 84439 ASSAY OF FREE THYROXINE: CPT

## 2020-11-06 PROCEDURE — 86800 THYROGLOBULIN ANTIBODY: CPT

## 2020-11-06 PROCEDURE — 84443 ASSAY THYROID STIM HORMONE: CPT

## 2020-11-06 PROCEDURE — 36415 COLL VENOUS BLD VENIPUNCTURE: CPT

## 2020-11-06 PROCEDURE — 84481 FREE ASSAY (FT-3): CPT

## 2020-11-06 PROCEDURE — 86376 MICROSOMAL ANTIBODY EACH: CPT

## 2020-11-27 ENCOUNTER — TELEPHONE (OUTPATIENT)
Dept: INTERNAL MEDICINE CLINIC | Facility: CLINIC | Age: 50
End: 2020-11-27

## 2020-11-27 NOTE — TELEPHONE ENCOUNTER
Pt asking if Dr Rain Addison can please call her  Abdominal distention is getting worse by the day, feels like an ulcer  Pt has seen specialist and [facility] clinic (results in pt chart)  Pt would like to discuss this with Dr Rani Addison  Should she be s

## 2020-11-27 NOTE — TELEPHONE ENCOUNTER
Routed to Dr. Valentin Ryan -     Spoke with [facility] who states this is not urgent, more bothersome and an ongoing issue. Her two main questions for you are:    1.  Do you wish to see her in the office to discuss bloating/abdominal distention/sour feeling in stom

## 2020-12-04 ENCOUNTER — PATIENT MESSAGE (OUTPATIENT)
Dept: INTERNAL MEDICINE CLINIC | Facility: CLINIC | Age: 50
End: 2020-12-04

## 2020-12-04 NOTE — TELEPHONE ENCOUNTER
From: Rachel Davenport  To: Yen Yoo MD  Sent: 12/4/2020 11:12 AM CST  Subject: Visit Katarina Colón! Just following up on a phone call with nurse last Friday.  Was wondering if you had a chance to review my latest blood w

## 2020-12-04 NOTE — TELEPHONE ENCOUNTER
To Dr. Saul Vann----    John Harrison!  Just following up on a phone call with nurse last Friday.  Was wondering if you had a chance to review my latest blood work in my chart but also wanted to discuss the worsening abdominal distension, upper abdominal muscu

## 2020-12-10 RX ORDER — SULFAMETHOXAZOLE AND TRIMETHOPRIM 800; 160 MG/1; MG/1
1 TABLET ORAL 2 TIMES DAILY
Qty: 20 TABLET | Refills: 0 | Status: SHIPPED | OUTPATIENT
Start: 2020-12-10 | End: 2021-02-24

## 2020-12-10 NOTE — TELEPHONE ENCOUNTER
H/o SIBO    Has responded well to rifaximin and neomycin in the past    rec- trial Bactrim DS one tab po BID #20    Pt called

## 2020-12-17 ENCOUNTER — OFFICE VISIT (OUTPATIENT)
Dept: INTEGRATIVE MEDICINE | Facility: CLINIC | Age: 50
End: 2020-12-17
Payer: COMMERCIAL

## 2020-12-17 VITALS
HEIGHT: 66 IN | OXYGEN SATURATION: 97 % | WEIGHT: 130.38 LBS | BODY MASS INDEX: 20.95 KG/M2 | HEART RATE: 67 BPM | SYSTOLIC BLOOD PRESSURE: 110 MMHG | DIASTOLIC BLOOD PRESSURE: 68 MMHG

## 2020-12-17 DIAGNOSIS — E53.9 VITAMIN B DEFICIENCY: ICD-10-CM

## 2020-12-17 DIAGNOSIS — G43.911 INTRACTABLE MIGRAINE WITH STATUS MIGRAINOSUS, UNSPECIFIED MIGRAINE TYPE: ICD-10-CM

## 2020-12-17 DIAGNOSIS — R79.89 LOW VITAMIN D LEVEL: ICD-10-CM

## 2020-12-17 DIAGNOSIS — Z78.9 VEGAN DIET: Primary | ICD-10-CM

## 2020-12-17 DIAGNOSIS — R10.30 LOWER ABDOMINAL PAIN: ICD-10-CM

## 2020-12-17 DIAGNOSIS — K59.00 CONSTIPATION, UNSPECIFIED CONSTIPATION TYPE: ICD-10-CM

## 2020-12-17 DIAGNOSIS — E60 LOW ZINC LEVEL: ICD-10-CM

## 2020-12-17 DIAGNOSIS — R51.9 HEADACHE ABOVE THE EYE REGION: ICD-10-CM

## 2020-12-17 DIAGNOSIS — K58.2 IRRITABLE BOWEL SYNDROME WITH BOTH CONSTIPATION AND DIARRHEA: ICD-10-CM

## 2020-12-17 DIAGNOSIS — B37.9 CANDIDIASIS: ICD-10-CM

## 2020-12-17 DIAGNOSIS — R14.0 BLOATING: ICD-10-CM

## 2020-12-17 PROCEDURE — 3008F BODY MASS INDEX DOCD: CPT | Performed by: FAMILY MEDICINE

## 2020-12-17 PROCEDURE — 99214 OFFICE O/P EST MOD 30 MIN: CPT | Performed by: FAMILY MEDICINE

## 2020-12-17 PROCEDURE — 3078F DIAST BP <80 MM HG: CPT | Performed by: FAMILY MEDICINE

## 2020-12-17 PROCEDURE — 3074F SYST BP LT 130 MM HG: CPT | Performed by: FAMILY MEDICINE

## 2020-12-17 NOTE — PROGRESS NOTES
Reyna Diego is a 52year old female. Patient presents with: Follow - Up: f/u on GI issues       HPI:     Started the GSE and Dysbiosis   3 weeks ago developed bloating, severe. Has had this before. Given abx by PCP, now on Bactrim.    Stopped all he daily.     • RESTASIS 0.05 % Ophthalmic Emulsion INSTILL 1 DROP INTO BOTH EYES BID.  3       SOCIAL HISTORY:   Social History    Socioeconomic History      Marital status:       Spouse name: Not on file      Number of children: Not on file      Year Other surgical history  2008    knee arthroscopy       PHYSICAL EXAM:      12/17/20  1230   BP: 110/68   Pulse: 67   SpO2: 97%   Weight: 130 lb 6.4 oz (59.1 kg)   Height: 5' 6\" (1.676 m)       Physical Exam   Constitutional: She is oriented to person, rambo below (the “Products”)  and their claims have not been evaluated by the Food and Drug Administration. Dietary products are not intended to treat, prevent, mitigate or cure disease.  Ultimately, you must draw your own conclusion as to the efficacy of the Pro

## 2020-12-17 NOTE — PATIENT INSTRUCTIONS
I have complete manohar in the body's ability to heal and transform. The products and items listed below (the “Products”)  and their claims have not been evaluated by the Food and Drug Administration.  Dietary products are not intended to treat, prevent, m

## 2020-12-22 ENCOUNTER — OFFICE VISIT (OUTPATIENT)
Dept: INTEGRATIVE MEDICINE | Facility: CLINIC | Age: 50
End: 2020-12-22
Payer: COMMERCIAL

## 2020-12-22 DIAGNOSIS — K58.2 IRRITABLE BOWEL SYNDROME WITH BOTH CONSTIPATION AND DIARRHEA: ICD-10-CM

## 2020-12-22 DIAGNOSIS — B37.9 CANDIDIASIS: ICD-10-CM

## 2020-12-22 DIAGNOSIS — Z71.3 NUTRITIONAL COUNSELING: ICD-10-CM

## 2020-12-22 DIAGNOSIS — Z78.9 CONSUMES A VEGAN DIET: ICD-10-CM

## 2020-12-22 PROCEDURE — 97803 MED NUTRITION INDIV SUBSEQ: CPT | Performed by: NUTRITIONIST

## 2020-12-22 NOTE — PROGRESS NOTES
Patient referred by Dr. Bailey ref. provider found  Did patient complete Nutritional Therapy Questionnaire? YES  Email: [email or web address]    Federico Milian is a 52year old female presenting for medical nutrition therapy in regards to abdominal bloat.   Since

## 2021-02-10 ENCOUNTER — OFFICE VISIT (OUTPATIENT)
Dept: INTEGRATIVE MEDICINE | Facility: CLINIC | Age: 51
End: 2021-02-10
Payer: COMMERCIAL

## 2021-02-10 DIAGNOSIS — K58.2 IRRITABLE BOWEL SYNDROME WITH BOTH CONSTIPATION AND DIARRHEA: ICD-10-CM

## 2021-02-10 DIAGNOSIS — B37.9 CANDIDIASIS: ICD-10-CM

## 2021-02-10 DIAGNOSIS — Z71.3 NUTRITIONAL COUNSELING: ICD-10-CM

## 2021-02-10 PROCEDURE — 97803 MED NUTRITION INDIV SUBSEQ: CPT | Performed by: NUTRITIONIST

## 2021-02-10 NOTE — PROGRESS NOTES
Patient referred by Ariel Ford ref. provider found  Did patient complete Nutritional Therapy Questionnaire? YES  [email or web address]    Mac Ormond is a 52year old female presenting for medical nutrition therapy in regards to abdominal bloat.   Since

## 2021-02-24 ENCOUNTER — OFFICE VISIT (OUTPATIENT)
Dept: INTERNAL MEDICINE CLINIC | Facility: CLINIC | Age: 51
End: 2021-02-24
Payer: COMMERCIAL

## 2021-02-24 ENCOUNTER — OFFICE VISIT (OUTPATIENT)
Dept: INTEGRATIVE MEDICINE | Facility: CLINIC | Age: 51
End: 2021-02-24
Payer: COMMERCIAL

## 2021-02-24 VITALS
SYSTOLIC BLOOD PRESSURE: 116 MMHG | DIASTOLIC BLOOD PRESSURE: 68 MMHG | OXYGEN SATURATION: 99 % | TEMPERATURE: 98 F | HEIGHT: 66 IN | BODY MASS INDEX: 22.02 KG/M2 | WEIGHT: 137 LBS | HEART RATE: 69 BPM

## 2021-02-24 VITALS
OXYGEN SATURATION: 99 % | HEIGHT: 66 IN | HEART RATE: 66 BPM | WEIGHT: 135 LBS | SYSTOLIC BLOOD PRESSURE: 116 MMHG | DIASTOLIC BLOOD PRESSURE: 70 MMHG | BODY MASS INDEX: 21.69 KG/M2

## 2021-02-24 DIAGNOSIS — R10.9 ABDOMINAL PAIN, UNSPECIFIED ABDOMINAL LOCATION: Primary | ICD-10-CM

## 2021-02-24 DIAGNOSIS — R10.30 LOWER ABDOMINAL PAIN: ICD-10-CM

## 2021-02-24 DIAGNOSIS — E04.1 THYROID NODULE: ICD-10-CM

## 2021-02-24 DIAGNOSIS — K59.00 CONSTIPATION, UNSPECIFIED CONSTIPATION TYPE: ICD-10-CM

## 2021-02-24 DIAGNOSIS — N30.10 INTERSTITIAL CYSTITIS: ICD-10-CM

## 2021-02-24 DIAGNOSIS — K63.89 SMALL INTESTINAL BACTERIAL OVERGROWTH: ICD-10-CM

## 2021-02-24 DIAGNOSIS — K58.2 IRRITABLE BOWEL SYNDROME WITH BOTH CONSTIPATION AND DIARRHEA: Primary | ICD-10-CM

## 2021-02-24 DIAGNOSIS — Z78.9 CONSUMES A VEGAN DIET: ICD-10-CM

## 2021-02-24 DIAGNOSIS — Z71.89 ENCOUNTER FOR HERB AND VITAMIN SUPPLEMENT MANAGEMENT: ICD-10-CM

## 2021-02-24 DIAGNOSIS — I87.1 NUTCRACKER PHENOMENON OF RENAL VEIN: ICD-10-CM

## 2021-02-24 DIAGNOSIS — R14.0 BLOATING: ICD-10-CM

## 2021-02-24 DIAGNOSIS — B37.9 CANDIDIASIS: ICD-10-CM

## 2021-02-24 PROBLEM — K63.8219 SMALL INTESTINAL BACTERIAL OVERGROWTH: Status: ACTIVE | Noted: 2021-02-24

## 2021-02-24 PROCEDURE — 3008F BODY MASS INDEX DOCD: CPT | Performed by: INTERNAL MEDICINE

## 2021-02-24 PROCEDURE — 3074F SYST BP LT 130 MM HG: CPT | Performed by: FAMILY MEDICINE

## 2021-02-24 PROCEDURE — 3074F SYST BP LT 130 MM HG: CPT | Performed by: INTERNAL MEDICINE

## 2021-02-24 PROCEDURE — 99214 OFFICE O/P EST MOD 30 MIN: CPT | Performed by: FAMILY MEDICINE

## 2021-02-24 PROCEDURE — 3078F DIAST BP <80 MM HG: CPT | Performed by: INTERNAL MEDICINE

## 2021-02-24 PROCEDURE — 3008F BODY MASS INDEX DOCD: CPT | Performed by: FAMILY MEDICINE

## 2021-02-24 PROCEDURE — 99214 OFFICE O/P EST MOD 30 MIN: CPT | Performed by: INTERNAL MEDICINE

## 2021-02-24 PROCEDURE — 3078F DIAST BP <80 MM HG: CPT | Performed by: FAMILY MEDICINE

## 2021-02-24 NOTE — PROGRESS NOTES
Kenya Santillan is a 52year old female. Patient presents with: Integrative Medicine Consult: 2 mo f/u      HPI:     Still having a lot of bloat and distention. Added in [address] for the past week. Getting bloating with eating and drinking.  Distenti RESTASIS 0.05 % Ophthalmic Emulsion INSTILL 1 DROP INTO BOTH EYES BID.  3       SOCIAL HISTORY:   Social History    Socioeconomic History      Marital status:       Spouse name: Not on file      Number of children: Not on file      Years of educatio history  2008    knee arthroscopy       PHYSICAL EXAM:      02/24/21  0833   BP: 116/70   Pulse: 66   SpO2: 99%   Weight: 135 lb (61.2 kg)   Height: 5' 6\" (1.676 m)       Physical Exam   Constitutional: She is oriented to person, place, and time and well- EV Final    INTERPRETIVE INFORMATION: Candida Ab, IgA      0.88 EV or less: . ...... Negative - No significant level                              of detectable Candida albicans                             antibody. 0.89-0.99 EV: . .........  Equivocal - [place] post-infection. Test developed and characteristics determined by [facility].  See Compliance Statement D:                            Secured Mail.Omni-ID/CS  Performed By: [address]  [place], [address] [facility] [address] This test may exhibit interference when a sample is collected from a person who is consuming high dose of biotin (a.k.a., vitamin B7, vitamin H, coenzyme R) supplements resulting in serum concentrations >=100 ng/mL, leading to falsely depressed Thyroglob diagnostic use under the FDA   Emergency Use Authorization (EUA) for [address] [facility] laboratories certified   under CLIA to perform high complexity tests. This test has not   been FDA cleared or approved.  In compliance with this   authorization, please visit   https:// Low probability of renal artery stenosis bilaterally. Color flow duplex imaging demonstrates normal waveforms and velocities in the bilateral renal arteries. Parenchymal signals and kidney size are normal bilaterally. The left renal artery is patent.  The l +------------------+-----+ --------------------------------------------------------------------------- - Study data:  Complete renal arterial duplex. [facility], [address]  [address], [facility]  Birthdate:  Patient Deanen Alvarez a licensed health care professional before starting any supplement, dietary, or exercise program, especially if you are pregnant or have any pre-existing injuries or medical conditions.  The patient agrees that the [facility] and its affilia

## 2021-02-24 NOTE — PROGRESS NOTES
Kenya Santillan is a 52year old female.     HPI:   Patient presents with:  Checkup: 6 month, would like to review results from ultrasound from 2/18     51 y/o F with abdominal bloating, SIBO here for F/U; MRI enterography in May 2020 was negative for IBD History    Tobacco Use      Smoking status: Former Smoker        Packs/day: 0.50        Years: 20.00        Pack years: 8        Quit date: 7/16/2010        Years since quitting: 10.6      Smokeless tobacco: Never Used    Alcohol use: Yes      Frequency: care of integrative medicine, Dr Cruz Both; has taking supplements intended to lower fungal organisms in intestinal kamaljit; was advised for option of biofeedback by Dr Alessandra Jones; has two BMs per day; no response to low FODMAP diet    Constipation  Pt had M q24hrs, d#2 and ceftriaxone 1 gm IV q24hrs, d#2; s/p  Ceftin 500 mg po BID x8d, and Zithromax 250 mg po qD x4d; F/U CXR 8/28/17 shows resolution of infiltrate     Dyspnea  CXR with +hyperinflation; PFTs normal in Sept 1855     Eosinophilic esophagitis  Had Soy Zelaya; s/p acupuncture with Integrative Medicine        RTC 6 mos  Cell [phone]      Spent 30 minutes obtaining history, evaluating patient, discussing treatment options, diet, exercise, review of available labs and radiology reports, and completing

## 2021-03-01 ENCOUNTER — HOSPITAL ENCOUNTER (OUTPATIENT)
Dept: GENERAL RADIOLOGY | Facility: HOSPITAL | Age: 51
Discharge: HOME | End: 2021-03-01
Attending: INTERNAL MEDICINE
Payer: COMMERCIAL

## 2021-03-01 ENCOUNTER — TELEPHONE (OUTPATIENT)
Dept: INTERNAL MEDICINE CLINIC | Facility: CLINIC | Age: 51
End: 2021-03-01

## 2021-03-01 DIAGNOSIS — K59.00 CONSTIPATION, UNSPECIFIED CONSTIPATION TYPE: ICD-10-CM

## 2021-03-01 DIAGNOSIS — R10.9 ABDOMINAL PAIN, UNSPECIFIED ABDOMINAL LOCATION: ICD-10-CM

## 2021-03-01 PROCEDURE — 74018 RADEX ABDOMEN 1 VIEW: CPT | Performed by: INTERNAL MEDICINE

## 2021-03-02 NOTE — TELEPHONE ENCOUNTER
KUB shows +moderate stool burden in abdomen    Imp- constipation    Rec- start Colace 100 mg po BID, Miralax 17 gm daily, Senokot one tab po BID    Pt called

## 2021-04-28 ENCOUNTER — HOSPITAL ENCOUNTER (OUTPATIENT)
Dept: CT IMAGING | Facility: HOSPITAL | Age: 51
Discharge: HOME | End: 2021-04-28
Attending: INTERNAL MEDICINE
Payer: COMMERCIAL

## 2021-04-28 DIAGNOSIS — G89.29 OTHER CHRONIC PAIN: ICD-10-CM

## 2021-04-28 DIAGNOSIS — K66.0 ABDOMINAL ADHESIONS: ICD-10-CM

## 2021-04-28 DIAGNOSIS — R10.9 STOMACH ACHE: ICD-10-CM

## 2021-04-28 DIAGNOSIS — K43.2 POSTOPERATIVE INCISIONAL HERNIA: ICD-10-CM

## 2021-04-28 PROCEDURE — 74177 CT ABD & PELVIS W/CONTRAST: CPT | Performed by: INTERNAL MEDICINE

## 2021-04-28 PROCEDURE — 82565 ASSAY OF CREATININE: CPT

## 2021-07-12 ENCOUNTER — TELEPHONE (OUTPATIENT)
Dept: SCHEDULING | Age: 51
End: 2021-07-12

## 2021-07-12 DIAGNOSIS — K62.9 PERIANAL LESION: Primary | ICD-10-CM

## 2021-09-16 ENCOUNTER — ORDER TRANSCRIPTION (OUTPATIENT)
Dept: PHYSICAL THERAPY | Facility: HOSPITAL | Age: 51
End: 2021-09-16

## 2021-09-16 DIAGNOSIS — K43.9 VENTRAL HERNIA WITHOUT OBSTRUCTION OR GANGRENE: Primary | ICD-10-CM

## 2021-09-17 ENCOUNTER — TELEPHONE (OUTPATIENT)
Dept: PHYSICAL THERAPY | Facility: HOSPITAL | Age: 51
End: 2021-09-17

## 2021-09-20 ENCOUNTER — TELEPHONE (OUTPATIENT)
Dept: PHYSICAL THERAPY | Facility: HOSPITAL | Age: 51
End: 2021-09-20

## 2021-09-21 ENCOUNTER — TELEPHONE (OUTPATIENT)
Dept: PHYSICAL THERAPY | Facility: HOSPITAL | Age: 51
End: 2021-09-21

## 2021-09-21 ENCOUNTER — OFFICE VISIT (OUTPATIENT)
Dept: PHYSICAL THERAPY | Age: 51
End: 2021-09-21
Attending: SURGERY
Payer: COMMERCIAL

## 2021-09-21 DIAGNOSIS — K43.9 VENTRAL HERNIA WITHOUT OBSTRUCTION OR GANGRENE: ICD-10-CM

## 2021-09-21 PROCEDURE — 97163 PT EVAL HIGH COMPLEX 45 MIN: CPT

## 2021-09-21 PROCEDURE — 97110 THERAPEUTIC EXERCISES: CPT

## 2021-09-21 NOTE — PROGRESS NOTES
Initial EVALUATION:   Referring Physician: Dr. Brooklyn Duffy  Diagnosis: Post op - Ventral Hernia repair on 8/19/21 - w/o mention of obstruction or gangrene - w/ 'tummy tuck' procedure performed concurrently (mesh implant present).  Date of Service: 9/21/2021 changes, saddle anesthesia, and MARIANO LE N/T.    ASSESSMENT  Micha Mas presents to physical therapy evaluation with primary c/o abdominal tightness/weakness and pain secondary to being Post op for Ventral Hernia repair on 8/19/21 w/ mesh implant - w/o mention o swelling present.      Strength:  LE   Hip flexion (L2): R 4+/5; L 4+/5  Hip abduction: R 4/5; L 4/5  Knee Flexion: R 4+/5; L 4+/5   Knee extension (L3): R 4+/5; L 4+/5   DF (L4): R 5/5; L 5/5  Great Toe Ext (L5): R 5/5, L 5/5  PF (S1): R 5/5; L 5/5    TA: Potential:excellent    FOTO: 51/100    Patient/Family/Caregiver was advised of these findings, precautions, and treatment options and has agreed to actively participate in planning and for this course of care.     Thank you for your referral. Please co-sign

## 2021-09-24 ENCOUNTER — OFFICE VISIT (OUTPATIENT)
Dept: PHYSICAL THERAPY | Age: 51
End: 2021-09-24
Attending: SURGERY
Payer: COMMERCIAL

## 2021-09-24 PROCEDURE — 97110 THERAPEUTIC EXERCISES: CPT

## 2021-09-24 NOTE — PROGRESS NOTES
Dx: Post op - Ventral Hernia repair on 8/19/21 - w/o mention of obstruction or gangrene - w/ 'tummy tuck' procedure performed concurrently (mesh implant present).   Insurance (Authorized # of Visits):  No auth required           Authorizing Physician: Dr. Lars Abbott HEP: add open book, krystina almanza    Charges: 3 therex       Total Timed Treatment: 45 min  Total Treatment Time: 45 min

## 2021-09-28 ENCOUNTER — OFFICE VISIT (OUTPATIENT)
Dept: PHYSICAL THERAPY | Age: 51
End: 2021-09-28
Attending: SURGERY
Payer: COMMERCIAL

## 2021-09-28 DIAGNOSIS — K43.9 VENTRAL HERNIA WITHOUT OBSTRUCTION OR GANGRENE: ICD-10-CM

## 2021-09-28 PROCEDURE — 97110 THERAPEUTIC EXERCISES: CPT

## 2021-09-28 PROCEDURE — 97112 NEUROMUSCULAR REEDUCATION: CPT

## 2021-09-28 NOTE — PROGRESS NOTES
Dx: Post op - Ventral Hernia repair on 8/19/21 - w/o mention of obstruction or gangrene - w/ 'tummy tuck' procedure performed concurrently (mesh implant present).   Insurance (Authorized # of Visits):  No auth required           Authorizing Physician: Dr. Josue Medellin piriformis stretch.     Charges: 2 therex, 1 neuro       Total Timed Treatment: 45 min  Total Treatment Time: 45 min

## 2021-09-30 ENCOUNTER — APPOINTMENT (OUTPATIENT)
Dept: PHYSICAL THERAPY | Age: 51
End: 2021-09-30
Attending: SURGERY
Payer: COMMERCIAL

## 2021-10-01 ENCOUNTER — OFFICE VISIT (OUTPATIENT)
Dept: PHYSICAL THERAPY | Age: 51
End: 2021-10-01
Attending: SURGERY
Payer: COMMERCIAL

## 2021-10-01 PROCEDURE — 97110 THERAPEUTIC EXERCISES: CPT

## 2021-10-01 PROCEDURE — 97112 NEUROMUSCULAR REEDUCATION: CPT

## 2021-10-01 NOTE — PROGRESS NOTES
Dx: Post op - Ventral Hernia repair on 8/19/21 - w/o mention of obstruction or gangrene - w/ 'tummy tuck' procedure performed concurrently (mesh implant present).   Insurance (Authorized # of Visits):  No auth required            Authorizing Physician:  LTRs x20     Bridges 2x10 Cat/camel x20 Cat/camel x20     R/L clams 2x10 R/L modified piriformis stretch 4x15\" Bridges 2x10     R/L hip abd SLR 2x10  R/L x-over bridge x20      Repeated sit<>supine transfer x15 @ bar squats x20 PPTs w/ SB pressdown in [place]

## 2021-10-08 ENCOUNTER — OFFICE VISIT (OUTPATIENT)
Dept: PHYSICAL THERAPY | Age: 51
End: 2021-10-08
Attending: SURGERY
Payer: COMMERCIAL

## 2021-10-08 DIAGNOSIS — K43.9 VENTRAL HERNIA WITHOUT OBSTRUCTION OR GANGRENE: ICD-10-CM

## 2021-10-08 PROCEDURE — 97110 THERAPEUTIC EXERCISES: CPT

## 2021-10-08 NOTE — PROGRESS NOTES
Dx: Post op - Ventral Hernia repair on 8/19/21 - w/o mention of obstruction or gangrene - w/ 'tummy tuck' procedure performed concurrently (mesh implant present).   Insurance (Authorized # of Visits):  No auth required            Authorizing Physician:  open book x20 LTRs x20     Bridges 2x10 Cat/camel x20 Cat/camel x20 Cat/camel x20    R/L clams 2x10 R/L modified piriformis stretch 4x15\" Bridges 2x10     R/L hip abd SLR 2x10  R/L x-over bridge x20      Repeated sit<>supine transfer x15 @ bar squats x20

## 2021-10-11 ENCOUNTER — OFFICE VISIT (OUTPATIENT)
Dept: PHYSICAL THERAPY | Age: 51
End: 2021-10-11
Attending: SURGERY
Payer: COMMERCIAL

## 2021-10-11 DIAGNOSIS — K43.9 VENTRAL HERNIA WITHOUT OBSTRUCTION OR GANGRENE: ICD-10-CM

## 2021-10-11 PROCEDURE — 97112 NEUROMUSCULAR REEDUCATION: CPT

## 2021-10-11 PROCEDURE — 97110 THERAPEUTIC EXERCISES: CPT

## 2021-10-11 NOTE — PROGRESS NOTES
Dx: Post op - Ventral Hernia repair on 8/19/21 - w/o mention of obstruction or gangrene - w/ 'tummy tuck' procedure performed concurrently (mesh implant present).   Insurance (Authorized # of Visits):  No auth required            Authorizing Physician:  R/L x-over bridge x20   SB glute pop x20   Repeated sit<>supine transfer x15 @ bar squats x20 PPTs w/ SB pressdown in hooklying 2x10 PPTs w/ SB pressdown in hooklying 2x10 PPTs w/ SB pressdown in hooklying 2x10    LBW & MW (AP/PA) - YTB - 3 laps each PPTs

## 2021-10-18 ENCOUNTER — OFFICE VISIT (OUTPATIENT)
Dept: PHYSICAL THERAPY | Age: 51
End: 2021-10-18
Attending: SURGERY
Payer: COMMERCIAL

## 2021-10-18 DIAGNOSIS — K43.9 VENTRAL HERNIA WITHOUT OBSTRUCTION OR GANGRENE: ICD-10-CM

## 2021-10-18 PROCEDURE — 97110 THERAPEUTIC EXERCISES: CPT

## 2021-10-18 PROCEDURE — 97112 NEUROMUSCULAR REEDUCATION: CPT

## 2021-10-18 NOTE — PROGRESS NOTES
ProgressSummary  Pt has attended 7 visits in Physical Therapy. Dx: Post op - Ventral Hernia repair on 8/19/21 - w/o mention of obstruction or gangrene - w/ 'tummy tuck' procedure performed concurrently (mesh implant present).   Insurance (Authorized # walks for improved hip strength. Added 3 way SLR in SLS to further challenge balance along with hip / core strength.  Added repeated extension in standing stretch to address c/o LBP and to promote improved spine extension and improved soft tissue extensibi R/L anterior SLR w/ TA brace 2x10      B supine HS bridge x20 - lvl 1  B supine HS curl from bridge position 2x10      Shuttle DLP: 5c 3x10 Shuttle DLP: 5c 3x10              MARQUITA - x10 - 3 sec hold                  HEP: Consider adding MARQUITA to HEP.      Fernando Patricia

## 2021-10-28 ENCOUNTER — OFFICE VISIT (OUTPATIENT)
Dept: PHYSICAL THERAPY | Age: 51
End: 2021-10-28
Attending: SURGERY
Payer: COMMERCIAL

## 2021-10-28 DIAGNOSIS — K43.9 VENTRAL HERNIA WITHOUT OBSTRUCTION OR GANGRENE: ICD-10-CM

## 2021-10-28 PROCEDURE — 97112 NEUROMUSCULAR REEDUCATION: CPT

## 2021-10-28 PROCEDURE — 97110 THERAPEUTIC EXERCISES: CPT

## 2021-10-28 NOTE — PROGRESS NOTES
Dx: Post op - Ventral Hernia repair on 8/19/21 - w/o mention of obstruction or gangrene - w/ 'tummy tuck' procedure performed concurrently (mesh implant present).   Insurance (Authorized # of Visits):  No auth required            Authorizing Physician:  Cat/camel x20 Cat/camel x20 Cat/camel x20     Bridges 2x10  Anterior walking lunges x1.5 laps (AKP?) R/L 3 way SLR in SLS - RTB - x20 each    R/L x-over bridge x20   SB glute pop x20  GTB row x30   PPTs w/ SB pressdown in hooklying 2x10 PPTs w/ SB pressd

## 2021-10-29 ENCOUNTER — OFFICE VISIT (OUTPATIENT)
Dept: PHYSICAL THERAPY | Age: 51
End: 2021-10-29
Attending: SURGERY
Payer: COMMERCIAL

## 2021-10-29 DIAGNOSIS — K43.9 VENTRAL HERNIA WITHOUT OBSTRUCTION OR GANGRENE: ICD-10-CM

## 2021-10-29 PROCEDURE — 97112 NEUROMUSCULAR REEDUCATION: CPT

## 2021-10-29 PROCEDURE — 97110 THERAPEUTIC EXERCISES: CPT

## 2021-10-29 NOTE — PROGRESS NOTES
Dx: Post op - Ventral Hernia repair on 8/19/21 - w/o mention of obstruction or gangrene - w/ 'tummy tuck' procedure performed concurrently (mesh implant present).   Insurance (Authorized # of Visits):  No auth required            Authorizing Physician:  Nustep 5' L7 - UE/LE - seat 9    LTRs w/ SB x20      Cat/camel x20 Cat/camel x20       Anterior walking lunges x1.5 laps (AKP?) R/L 3 way SLR in SLS - RTB - x20 each      SB glute pop x20  GTB row x30    PPTs w/ SB pressdown in hooklying 2x10 PPTs w/ SB pr

## 2021-11-02 ENCOUNTER — OFFICE VISIT (OUTPATIENT)
Dept: PHYSICAL THERAPY | Age: 51
End: 2021-11-02
Attending: SURGERY
Payer: COMMERCIAL

## 2021-11-02 DIAGNOSIS — K43.9 VENTRAL HERNIA WITHOUT OBSTRUCTION OR GANGRENE: ICD-10-CM

## 2021-11-02 PROCEDURE — 97112 NEUROMUSCULAR REEDUCATION: CPT

## 2021-11-02 PROCEDURE — 97110 THERAPEUTIC EXERCISES: CPT

## 2021-11-02 NOTE — PROGRESS NOTES
Dx: Post op - Ventral Hernia repair on 8/19/21 - w/o mention of obstruction or gangrene - w/ 'tummy tuck' procedure performed concurrently (mesh implant present).   Insurance (Authorized # of Visits):  No auth required            Authorizing Physician:  UE/LE - seat 9 Nustep 5' L5 - UE/LE - seat 9      LTRs x25      Bridges x25   R/L 3 way SLR in SLS - RTB - x20 each       GTB row x30     PPTs w/ SB pressdown in hooklying 2x10   R/L seated QL stretch 3x20\"    LBW - RTB - 3 laps each   LBW - RTB - 3 laps

## 2021-11-04 ENCOUNTER — OFFICE VISIT (OUTPATIENT)
Dept: PHYSICAL THERAPY | Age: 51
End: 2021-11-04
Attending: SURGERY
Payer: COMMERCIAL

## 2021-11-04 DIAGNOSIS — K43.9 VENTRAL HERNIA WITHOUT OBSTRUCTION OR GANGRENE: ICD-10-CM

## 2021-11-04 PROCEDURE — 97110 THERAPEUTIC EXERCISES: CPT

## 2021-11-04 PROCEDURE — 97112 NEUROMUSCULAR REEDUCATION: CPT

## 2021-11-04 NOTE — PROGRESS NOTES
Dx: Post op - Ventral Hernia repair on 8/19/21 - w/o mention of obstruction or gangrene - w/ 'tummy tuck' procedure performed concurrently (mesh implant present).   Insurance (Authorized # of Visits):  No auth required             Authorizing Physician: OMER Nustep 5' L5 - UE/LE - seat 9 Nustep 5' L5 - UE/LE - seat 9      LTRs x25       Bridges x25 Bridges w/ FR 2x10   R/L 3 way SLR in SLS - RTB - x20 each    Wall pushup 2x10    GTB row x30   GTB row x30   PPTs w/ SB pressdown in hooklying 2x10   R/L seated QL

## 2021-11-09 ENCOUNTER — OFFICE VISIT (OUTPATIENT)
Dept: PHYSICAL THERAPY | Age: 51
End: 2021-11-09
Attending: SURGERY
Payer: COMMERCIAL

## 2021-11-09 DIAGNOSIS — K43.9 VENTRAL HERNIA WITHOUT OBSTRUCTION OR GANGRENE: ICD-10-CM

## 2021-11-09 PROCEDURE — 97110 THERAPEUTIC EXERCISES: CPT

## 2021-11-09 PROCEDURE — 97112 NEUROMUSCULAR REEDUCATION: CPT

## 2021-11-09 NOTE — PROGRESS NOTES
Dx: Post op - Ventral Hernia repair on 8/19/21 - w/o mention of obstruction or gangrene - w/ 'tummy tuck' procedure performed concurrently (mesh implant present).   Insurance (Authorized # of Visits):  No auth required             Authorizing Physician: OMER 5' L5 - UE/LE - seat 9 Nustep 5' L5 - UE/LE - seat 9      LTRs x25        Bridges x25 Bridges w/ FR 2x10 Bridges w/ FR 3x10   R/L 3 way SLR in SLS - RTB - x20 each    Wall pushup 2x10     GTB row x30   GTB row x30 BTB row x30   PPTs w/ SB pressdown in hook

## 2021-11-11 ENCOUNTER — OFFICE VISIT (OUTPATIENT)
Dept: PHYSICAL THERAPY | Age: 51
End: 2021-11-11
Attending: SURGERY
Payer: COMMERCIAL

## 2021-11-11 DIAGNOSIS — K43.9 VENTRAL HERNIA WITHOUT OBSTRUCTION OR GANGRENE: ICD-10-CM

## 2021-11-11 PROCEDURE — 97110 THERAPEUTIC EXERCISES: CPT

## 2021-11-11 PROCEDURE — 97112 NEUROMUSCULAR REEDUCATION: CPT

## 2021-11-11 NOTE — PROGRESS NOTES
Dx: Post op - Ventral Hernia repair on 8/19/21 - w/o mention of obstruction or gangrene - w/ 'tummy tuck' procedure performed concurrently (mesh implant present).   Insurance (Authorized # of Visits):  No auth required             Authorizing Physician: OMER 2x10 Bridges w/ FR 3x10 Bridges w/ FR 3x10     Wall pushup 2x10  Wall pushup 2x10     GTB row x30 BTB row x30 BTB row x30    R/L seated QL stretch 3x20\"   Seated lumbar flexion stretch x15 3 way seated lumbar flexion stretch x10    LBW - RTB - 3 laps each

## 2021-11-16 ENCOUNTER — OFFICE VISIT (OUTPATIENT)
Dept: PHYSICAL THERAPY | Age: 51
End: 2021-11-16
Attending: SURGERY
Payer: COMMERCIAL

## 2021-11-16 DIAGNOSIS — K43.9 VENTRAL HERNIA WITHOUT OBSTRUCTION OR GANGRENE: ICD-10-CM

## 2021-11-16 PROCEDURE — 97110 THERAPEUTIC EXERCISES: CPT

## 2021-11-16 PROCEDURE — 97112 NEUROMUSCULAR REEDUCATION: CPT

## 2021-11-16 NOTE — PROGRESS NOTES
Dx: Post op - Ventral Hernia repair on 8/19/21 - w/o mention of obstruction or gangrene - w/ 'tummy tuck' procedure performed concurrently (mesh implant present).   Insurance (Authorized # of Visits):  No auth required             Authorizing Physician: OMER w/ FR 3x10 Bridges w/ FR 3x10     Wall pushup 2x10  Wall pushup 2x10 Wall pushup 3x10    GTB row x30 BTB row x30 BTB row x30 Black TB row x30   R/L seated QL stretch 3x20\"   Seated lumbar flexion stretch x15 3 way seated lumbar flexion stretch x10 3 way s

## 2021-11-18 ENCOUNTER — OFFICE VISIT (OUTPATIENT)
Dept: PHYSICAL THERAPY | Age: 51
End: 2021-11-18
Attending: INTERNAL MEDICINE
Payer: COMMERCIAL

## 2021-11-18 ENCOUNTER — APPOINTMENT (OUTPATIENT)
Dept: PHYSICAL THERAPY | Age: 51
End: 2021-11-18
Attending: SURGERY
Payer: COMMERCIAL

## 2021-11-18 PROCEDURE — 97110 THERAPEUTIC EXERCISES: CPT

## 2021-11-18 PROCEDURE — 97112 NEUROMUSCULAR REEDUCATION: CPT

## 2021-11-18 NOTE — PROGRESS NOTES
Dx: Post op - Ventral Hernia repair on 8/19/21 - w/o mention of obstruction or gangrene - w/ 'tummy tuck' procedure performed concurrently (mesh implant present).   Insurance (Authorized # of Visits):  No auth required             Authorizing Physician: OMER Nustep 5' L5 - UE/LE - seat 8     R/L hip abd SLR x20 R/L hip abd SLR x20 R/L hip abd SLR x20   Bridges w/ FR 2x10 Bridges w/ FR 3x10 Bridges w/ FR 3x10  R/L anterior SLR w/ TA brace x20   Wall pushup 2x10  Wall pushup 2x10 Wall pushup 3x10    GTB row x3

## 2021-11-19 ENCOUNTER — TELEPHONE (OUTPATIENT)
Dept: PHYSICAL THERAPY | Facility: HOSPITAL | Age: 51
End: 2021-11-19

## 2021-12-03 ENCOUNTER — OFFICE VISIT (OUTPATIENT)
Dept: PHYSICAL THERAPY | Age: 51
End: 2021-12-03
Attending: SURGERY
Payer: COMMERCIAL

## 2021-12-03 PROCEDURE — 97112 NEUROMUSCULAR REEDUCATION: CPT

## 2021-12-03 PROCEDURE — 97110 THERAPEUTIC EXERCISES: CPT

## 2021-12-03 NOTE — PROGRESS NOTES
Dx: Post op - Ventral Hernia repair on 8/19/21 - w/o mention of obstruction or gangrene - w/ 'tummy tuck' procedure performed concurrently (mesh implant present).   Insurance (Authorized # of Visits):  No auth required             Authorizing Physician: OMER 12/3/21  Tx#: 16   Nustep 5' L5 - UE/LE - seat 9 Nustep 5' L5 - UE/LE - seat 9   Nustep 5' L5 - UE/LE - seat 8      R/L hip abd SLR x20 R/L hip abd SLR x20 R/L hip abd SLR x20 PPTs 3x10   Bridges w/ FR 2x10 Bridges w/ FR 3x10 Bridges w/ FR 3x10  R/L anteri

## 2021-12-06 ENCOUNTER — OFFICE VISIT (OUTPATIENT)
Dept: PHYSICAL THERAPY | Age: 51
End: 2021-12-06
Attending: SURGERY
Payer: COMMERCIAL

## 2021-12-06 PROCEDURE — 97112 NEUROMUSCULAR REEDUCATION: CPT

## 2021-12-06 PROCEDURE — 97110 THERAPEUTIC EXERCISES: CPT

## 2021-12-06 NOTE — PROGRESS NOTES
Dx: Post op - Ventral Hernia repair on 8/19/21 - w/o mention of obstruction or gangrene - w/ 'tummy tuck' procedure performed concurrently (mesh implant present).   Insurance (Authorized # of Visits):  No auth required             Authorizing Physician: OMER x20 R/L hip abd SLR x20 R/L hip abd SLR x20 PPTs 3x10 PPTs + SB press 3x10   Bridges w/ FR 3x10  R/L anterior SLR w/ TA brace x20 R/L anterior SLR w/ TA brace 3x10 1# High table pushups 3x10    Wall pushup 2x10 Wall pushup 3x10  SB HS curls 4x5 SB HS curls

## 2021-12-07 ENCOUNTER — APPOINTMENT (OUTPATIENT)
Dept: PHYSICAL THERAPY | Age: 51
End: 2021-12-07
Attending: SURGERY
Payer: COMMERCIAL

## 2021-12-09 ENCOUNTER — OFFICE VISIT (OUTPATIENT)
Dept: PHYSICAL THERAPY | Age: 51
End: 2021-12-09
Attending: SURGERY
Payer: COMMERCIAL

## 2021-12-09 PROCEDURE — 97110 THERAPEUTIC EXERCISES: CPT

## 2021-12-09 PROCEDURE — 97112 NEUROMUSCULAR REEDUCATION: CPT

## 2021-12-09 NOTE — PROGRESS NOTES
Dx: Post op - Ventral Hernia repair on 8/19/21 - w/o mention of obstruction or gangrene - w/ 'tummy tuck' procedure performed concurrently (mesh implant present).   Insurance (Authorized # of Visits):  No auth required             Authorizing Physician: OMER 12/9/21  Tx#: 18     Nustep 5' L5 - UE/LE - seat 8  Nustep 10' L5 - UE/LE - seat 8 Nustep 5' L5 - UE/LE - seat 8   R/L hip abd SLR x20 R/L hip abd SLR x20 R/L hip abd SLR x20 PPTs 3x10 PPTs + SB press 3x10 1/2 kneeling hip flexor stretch w/ sidebend x5   B pushups, full front plank, 1/2 kneeling hip flexor stretch w/ sidebend, TFL stretch in standing.     Charges: 2 therex, 1 neuro    Total Timed Treatment: 45 min  Total Treatment Time: 45 min

## 2021-12-10 ENCOUNTER — APPOINTMENT (OUTPATIENT)
Dept: PHYSICAL THERAPY | Age: 51
End: 2021-12-10
Attending: SURGERY
Payer: COMMERCIAL

## 2021-12-13 ENCOUNTER — OFFICE VISIT (OUTPATIENT)
Dept: PHYSICAL THERAPY | Age: 51
End: 2021-12-13
Attending: SURGERY
Payer: COMMERCIAL

## 2021-12-13 PROCEDURE — 97112 NEUROMUSCULAR REEDUCATION: CPT

## 2021-12-13 PROCEDURE — 97110 THERAPEUTIC EXERCISES: CPT

## 2021-12-13 PROCEDURE — 97535 SELF CARE MNGMENT TRAINING: CPT

## 2021-12-13 NOTE — PROGRESS NOTES
Dx: Post op - Ventral Hernia repair on 8/19/21 - w/o mention of obstruction or gangrene - w/ 'tummy tuck' procedure performed concurrently (mesh implant present).   Insurance (Authorized # of Visits):  No auth required             Authorizing Physician: OMER 6' L5 - UE/LE - seat 8   R/L hip abd SLR x20 PPTs 3x10 PPTs + SB press 3x10 1/2 kneeling hip flexor stretch w/ sidebend x5    R/L anterior SLR w/ TA brace x20 R/L anterior SLR w/ TA brace 3x10 1# High table pushups 3x10  R/L standing lateral hamstring stre

## 2021-12-14 ENCOUNTER — APPOINTMENT (OUTPATIENT)
Dept: PHYSICAL THERAPY | Age: 51
End: 2021-12-14
Attending: SURGERY
Payer: COMMERCIAL

## 2021-12-17 ENCOUNTER — OFFICE VISIT (OUTPATIENT)
Dept: PHYSICAL THERAPY | Age: 51
End: 2021-12-17
Attending: SURGERY
Payer: COMMERCIAL

## 2021-12-17 PROCEDURE — 97110 THERAPEUTIC EXERCISES: CPT

## 2021-12-17 PROCEDURE — 97112 NEUROMUSCULAR REEDUCATION: CPT

## 2021-12-17 NOTE — PROGRESS NOTES
Dx: Post op - Ventral Hernia repair on 8/19/21 - w/o mention of obstruction or gangrene - w/ 'tummy tuck' procedure performed concurrently (mesh implant present).   Insurance (Authorized # of Visits):  No auth required             Authorizing Physician: OMER ADLs. - some progress  Patient will be able to resume all ADLs at [facility]. - some progress  Patient will demo 5/5 core strength/control. - some progress   Patient will demo decreased point tenderness.  - some progress  Patient will demo improved scar mobility HEP: reviewed and re-organized on 12/13/21    Charges: 2 therex, 1 neuro    Total Timed Treatment: 45 min  Total Treatment Time: 45 min

## 2021-12-20 ENCOUNTER — OFFICE VISIT (OUTPATIENT)
Dept: PHYSICAL THERAPY | Age: 51
End: 2021-12-20
Attending: SURGERY
Payer: COMMERCIAL

## 2021-12-20 PROCEDURE — 97110 THERAPEUTIC EXERCISES: CPT

## 2021-12-20 PROCEDURE — 97112 NEUROMUSCULAR REEDUCATION: CPT

## 2021-12-20 NOTE — PROGRESS NOTES
Dx: Post op - Ventral Hernia repair on 8/19/21 - w/o mention of obstruction or gangrene - w/ 'tummy tuck' procedure performed concurrently (mesh implant present).   Insurance (Authorized # of Visits):  No auth required             Authorizing Physician: OMER Date: 12/20/21  Tx#: 21    Nustep 5' L5 - UE/LE - seat 8 Nustep 6' L5 - UE/LE - seat 8 UBE - 3'/3' - L5 - w/ lumbar support - seat 7 UBE - 3'/3' - L5 - w/ lumbar support - seat 7    1/2 kneeling hip flexor stretch w/ sidebend x5  LBW - RTB x5L Multi level

## 2021-12-21 ENCOUNTER — APPOINTMENT (OUTPATIENT)
Dept: PHYSICAL THERAPY | Age: 51
End: 2021-12-21
Attending: SURGERY
Payer: COMMERCIAL

## 2021-12-23 ENCOUNTER — APPOINTMENT (OUTPATIENT)
Dept: PHYSICAL THERAPY | Age: 51
End: 2021-12-23
Attending: SURGERY
Payer: COMMERCIAL

## 2021-12-27 ENCOUNTER — APPOINTMENT (OUTPATIENT)
Dept: PHYSICAL THERAPY | Age: 51
End: 2021-12-27
Attending: SURGERY
Payer: COMMERCIAL

## 2021-12-28 ENCOUNTER — APPOINTMENT (OUTPATIENT)
Dept: PHYSICAL THERAPY | Age: 51
End: 2021-12-28
Attending: SURGERY
Payer: COMMERCIAL

## 2021-12-30 ENCOUNTER — APPOINTMENT (OUTPATIENT)
Dept: PHYSICAL THERAPY | Age: 51
End: 2021-12-30
Attending: SURGERY
Payer: COMMERCIAL

## 2022-01-01 ENCOUNTER — EXTERNAL RECORD (OUTPATIENT)
Dept: INFUSION THERAPY | Age: 52
End: 2022-01-01

## 2022-01-13 ENCOUNTER — HOSPITAL ENCOUNTER (OUTPATIENT)
Dept: ULTRASOUND IMAGING | Age: 52
Discharge: HOME | End: 2022-01-13
Attending: INTERNAL MEDICINE

## 2022-01-13 DIAGNOSIS — R10.2 PELVIC PAIN: ICD-10-CM

## 2022-01-13 PROCEDURE — 76856 US EXAM PELVIC COMPLETE: CPT

## 2022-02-10 ENCOUNTER — TELEPHONE (OUTPATIENT)
Dept: PHYSICAL THERAPY | Age: 52
End: 2022-02-10

## 2022-06-13 ENCOUNTER — TELEPHONE (OUTPATIENT)
Dept: SCHEDULING | Age: 52
End: 2022-06-13

## 2022-06-24 ENCOUNTER — TELEPHONE (OUTPATIENT)
Dept: SCHEDULING | Age: 52
End: 2022-06-24

## 2022-06-24 ENCOUNTER — HOSPITAL ENCOUNTER (OUTPATIENT)
Dept: GENERAL RADIOLOGY | Age: 52
Discharge: HOME | End: 2022-06-24
Attending: INTERNAL MEDICINE

## 2022-06-24 DIAGNOSIS — R14.0 BLOATED ABDOMEN: ICD-10-CM

## 2022-06-24 PROCEDURE — 74019 RADEX ABDOMEN 2 VIEWS: CPT

## 2022-09-02 ENCOUNTER — HOSPITAL ENCOUNTER (OUTPATIENT)
Dept: LAB | Age: 52
Discharge: HOME | End: 2022-09-02
Attending: INTERNAL MEDICINE

## 2022-09-02 DIAGNOSIS — K21.9 GASTRO-ESOPHAGEAL REFLUX DISEASE WITHOUT ESOPHAGITIS: Primary | ICD-10-CM

## 2022-09-02 DIAGNOSIS — R10.10 UPPER ABDOMINAL PAIN, UNSPECIFIED: ICD-10-CM

## 2022-09-02 LAB
ALBUMIN SERPL-MCNC: 3.8 G/DL (ref 3.6–5.1)
ALP SERPL-CCNC: 63 UNITS/L (ref 45–117)
ALT SERPL-CCNC: 20 UNITS/L
AST SERPL-CCNC: 19 UNITS/L
BASOPHILS # BLD: 0 K/MCL (ref 0–0.3)
BASOPHILS NFR BLD: 1 %
BILIRUB CONJ SERPL-MCNC: 0.1 MG/DL (ref 0–0.2)
BILIRUB SERPL-MCNC: 0.3 MG/DL (ref 0.2–1)
BUN SERPL-MCNC: 10 MG/DL (ref 6–20)
BUN/CREAT SERPL: 14 (ref 7–25)
CREAT SERPL-MCNC: 0.73 MG/DL (ref 0.51–0.95)
DEPRECATED RDW RBC: 44.8 FL (ref 39–50)
EOSINOPHIL # BLD: 0.3 K/MCL (ref 0–0.5)
EOSINOPHIL NFR BLD: 5 %
ERYTHROCYTE [DISTWIDTH] IN BLOOD: 11.9 % (ref 11–15)
GFR SERPLBLD BASED ON 1.73 SQ M-ARVRAT: >90 ML/MIN
HCT VFR BLD CALC: 39.5 % (ref 36–46.5)
HGB BLD-MCNC: 13.5 G/DL (ref 12–15.5)
IMM GRANULOCYTES # BLD AUTO: 0 K/MCL (ref 0–0.2)
IMM GRANULOCYTES # BLD: 0 %
LIPASE SERPL-CCNC: 155 UNITS/L (ref 73–393)
LYMPHOCYTES # BLD: 1.7 K/MCL (ref 1–4.8)
LYMPHOCYTES NFR BLD: 26 %
MCH RBC QN AUTO: 35.3 PG (ref 26–34)
MCHC RBC AUTO-ENTMCNC: 34.2 G/DL (ref 32–36.5)
MCV RBC AUTO: 103.4 FL (ref 78–100)
MONOCYTES # BLD: 0.6 K/MCL (ref 0.3–0.9)
MONOCYTES NFR BLD: 9 %
NEUTROPHILS # BLD: 3.9 K/MCL (ref 1.8–7.7)
NEUTROPHILS NFR BLD: 59 %
NRBC BLD MANUAL-RTO: 0 /100 WBC
PLATELET # BLD AUTO: 210 K/MCL (ref 140–450)
PROT SERPL-MCNC: 7.2 G/DL (ref 6.4–8.2)
RBC # BLD: 3.82 MIL/MCL (ref 4–5.2)
WBC # BLD: 6.6 K/MCL (ref 4.2–11)

## 2022-09-02 PROCEDURE — 85025 COMPLETE CBC W/AUTO DIFF WBC: CPT | Performed by: CLINICAL MEDICAL LABORATORY

## 2022-09-02 PROCEDURE — 80076 HEPATIC FUNCTION PANEL: CPT | Performed by: CLINICAL MEDICAL LABORATORY

## 2022-09-02 PROCEDURE — 84520 ASSAY OF UREA NITROGEN: CPT | Performed by: CLINICAL MEDICAL LABORATORY

## 2022-09-02 PROCEDURE — 83690 ASSAY OF LIPASE: CPT | Performed by: CLINICAL MEDICAL LABORATORY

## 2022-09-02 PROCEDURE — 36415 COLL VENOUS BLD VENIPUNCTURE: CPT | Performed by: CLINICAL MEDICAL LABORATORY

## 2022-09-02 PROCEDURE — 82565 ASSAY OF CREATININE: CPT | Performed by: CLINICAL MEDICAL LABORATORY

## 2022-09-21 ENCOUNTER — TELEPHONE (OUTPATIENT)
Dept: SCHEDULING | Age: 52
End: 2022-09-21

## 2022-09-21 DIAGNOSIS — D75.89 MACROCYTOSIS: Primary | ICD-10-CM

## 2022-09-22 ENCOUNTER — TELEPHONE (OUTPATIENT)
Dept: HEMATOLOGY/ONCOLOGY | Age: 52
End: 2022-09-22

## 2022-09-27 ENCOUNTER — OFFICE VISIT (OUTPATIENT)
Dept: HEMATOLOGY/ONCOLOGY | Age: 52
End: 2022-09-27

## 2022-09-27 VITALS
HEIGHT: 67 IN | HEART RATE: 65 BPM | BODY MASS INDEX: 22.6 KG/M2 | WEIGHT: 144 LBS | SYSTOLIC BLOOD PRESSURE: 117 MMHG | OXYGEN SATURATION: 99 % | DIASTOLIC BLOOD PRESSURE: 77 MMHG | TEMPERATURE: 98.5 F

## 2022-09-27 DIAGNOSIS — D75.89 MACROCYTOSIS: ICD-10-CM

## 2022-09-27 DIAGNOSIS — E83.19 IRON OVERLOAD: Primary | ICD-10-CM

## 2022-09-27 PROCEDURE — 99203 OFFICE O/P NEW LOW 30 MIN: CPT | Performed by: STUDENT IN AN ORGANIZED HEALTH CARE EDUCATION/TRAINING PROGRAM

## 2022-09-27 PROCEDURE — 99202 OFFICE O/P NEW SF 15 MIN: CPT

## 2022-09-27 RX ORDER — ESTRADIOL 0.1 MG/G
CREAM VAGINAL
COMMUNITY
Start: 2022-07-05 | End: 2022-12-15 | Stop reason: ALTCHOICE

## 2022-09-27 RX ORDER — POLYETHYLENE GLYCOL 3350 17 G/17G
POWDER, FOR SOLUTION ORAL
COMMUNITY
Start: 2021-09-13

## 2022-09-27 RX ORDER — PANTOPRAZOLE SODIUM 40 MG/1
40 TABLET, DELAYED RELEASE ORAL DAILY
COMMUNITY
Start: 2022-09-10

## 2022-09-27 RX ORDER — FLUTICASONE PROPIONATE 50 MCG
SPRAY, SUSPENSION (ML) NASAL
COMMUNITY
Start: 2021-03-24 | End: 2022-12-15 | Stop reason: ALTCHOICE

## 2022-09-27 ASSESSMENT — PAIN SCALES - GENERAL: PAINLEVEL: 0

## 2022-09-28 ENCOUNTER — HOSPITAL ENCOUNTER (OUTPATIENT)
Dept: LAB | Age: 52
Discharge: STILL ADMITTED OR ADMINISTRATIVE | End: 2022-09-28
Attending: STUDENT IN AN ORGANIZED HEALTH CARE EDUCATION/TRAINING PROGRAM

## 2022-09-28 DIAGNOSIS — E83.19 IRON OVERLOAD: ICD-10-CM

## 2022-09-28 DIAGNOSIS — D75.89 MACROCYTOSIS: ICD-10-CM

## 2022-09-28 LAB
ALBUMIN SERPL-MCNC: 3.8 G/DL (ref 3.6–5.1)
ALBUMIN/GLOB SERPL: 1 {RATIO} (ref 1–2.4)
ALP SERPL-CCNC: 66 UNITS/L (ref 45–117)
ALT SERPL-CCNC: 20 UNITS/L
ANION GAP SERPL CALC-SCNC: 10 MMOL/L (ref 7–19)
AST SERPL-CCNC: 17 UNITS/L
BASOPHILS # BLD: 0.1 K/MCL (ref 0–0.3)
BASOPHILS NFR BLD: 1 %
BILIRUB SERPL-MCNC: 0.6 MG/DL (ref 0.2–1)
BUN SERPL-MCNC: 9 MG/DL (ref 6–20)
BUN/CREAT SERPL: 12 (ref 7–25)
CALCIUM SERPL-MCNC: 9.2 MG/DL (ref 8.4–10.2)
CHLORIDE SERPL-SCNC: 107 MMOL/L (ref 97–110)
CO2 SERPL-SCNC: 25 MMOL/L (ref 21–32)
CREAT SERPL-MCNC: 0.77 MG/DL (ref 0.51–0.95)
DEPRECATED RDW RBC: 45.6 FL (ref 39–50)
EOSINOPHIL # BLD: 0.4 K/MCL (ref 0–0.5)
EOSINOPHIL NFR BLD: 6 %
ERYTHROCYTE [DISTWIDTH] IN BLOOD: 11.9 % (ref 11–15)
FASTING DURATION TIME PATIENT: NORMAL H
FERRITIN SERPL-MCNC: 28 NG/ML (ref 8–252)
GFR SERPLBLD BASED ON 1.73 SQ M-ARVRAT: >90 ML/MIN
GLOBULIN SER-MCNC: 3.9 G/DL (ref 2–4)
GLUCOSE SERPL-MCNC: 94 MG/DL (ref 70–99)
HAPTOGLOB SERPL-MCNC: 145 MG/DL (ref 30–200)
HCT VFR BLD CALC: 40.6 % (ref 36–46.5)
HCYS SERPL-SCNC: 6.7 MCMOL/L
HGB BLD-MCNC: 14 G/DL (ref 12–15.5)
HGB RETIC QN AUTO: 40 PG (ref 28.6–36.3)
HIV 1+2 AB+HIV1 P24 AG SERPL QL IA: NONREACTIVE
IMM GRANULOCYTES # BLD AUTO: 0 K/MCL (ref 0–0.2)
IMM GRANULOCYTES # BLD: 0 %
IMM RETICS NFR: 14.5 % (ref 1.5–16)
IRON SATN MFR SERPL: 60 % (ref 15–45)
IRON SERPL-MCNC: 177 MCG/DL (ref 50–170)
LDH SERPL L TO P-CCNC: 130 UNITS/L (ref 82–240)
LYMPHOCYTES # BLD: 1.4 K/MCL (ref 1–4.8)
LYMPHOCYTES NFR BLD: 18 %
MCH RBC QN AUTO: 35.4 PG (ref 26–34)
MCHC RBC AUTO-ENTMCNC: 34.5 G/DL (ref 32–36.5)
MCV RBC AUTO: 102.8 FL (ref 78–100)
MONOCYTES # BLD: 0.7 K/MCL (ref 0.3–0.9)
MONOCYTES NFR BLD: 9 %
NEUTROPHILS # BLD: 5 K/MCL (ref 1.8–7.7)
NEUTROPHILS NFR BLD: 66 %
NRBC BLD MANUAL-RTO: 0 /100 WBC
PLATELET # BLD AUTO: 234 K/MCL (ref 140–450)
POTASSIUM SERPL-SCNC: 3.7 MMOL/L (ref 3.4–5.1)
PROT SERPL-MCNC: 7.7 G/DL (ref 6.4–8.2)
RBC # BLD: 3.95 MIL/MCL (ref 4–5.2)
RETICS #: 64 K/MCL (ref 10–120)
RETICS/RBC NFR: 1.6 % (ref 0.3–2.5)
SODIUM SERPL-SCNC: 138 MMOL/L (ref 135–145)
TIBC SERPL-MCNC: 296 MCG/DL (ref 250–450)
TSH SERPL-ACNC: 1.19 MCUNITS/ML (ref 0.35–5)
WBC # BLD: 7.6 K/MCL (ref 4.2–11)

## 2022-09-28 PROCEDURE — 82728 ASSAY OF FERRITIN: CPT | Performed by: STUDENT IN AN ORGANIZED HEALTH CARE EDUCATION/TRAINING PROGRAM

## 2022-09-28 PROCEDURE — 81256 HFE GENE: CPT | Performed by: STUDENT IN AN ORGANIZED HEALTH CARE EDUCATION/TRAINING PROGRAM

## 2022-09-28 PROCEDURE — 85046 RETICYTE/HGB CONCENTRATE: CPT | Performed by: STUDENT IN AN ORGANIZED HEALTH CARE EDUCATION/TRAINING PROGRAM

## 2022-09-28 PROCEDURE — 80053 COMPREHEN METABOLIC PANEL: CPT | Performed by: STUDENT IN AN ORGANIZED HEALTH CARE EDUCATION/TRAINING PROGRAM

## 2022-09-28 PROCEDURE — 87389 HIV-1 AG W/HIV-1&-2 AB AG IA: CPT | Performed by: STUDENT IN AN ORGANIZED HEALTH CARE EDUCATION/TRAINING PROGRAM

## 2022-09-28 PROCEDURE — 36415 COLL VENOUS BLD VENIPUNCTURE: CPT | Performed by: STUDENT IN AN ORGANIZED HEALTH CARE EDUCATION/TRAINING PROGRAM

## 2022-09-28 PROCEDURE — 83090 ASSAY OF HOMOCYSTEINE: CPT | Performed by: STUDENT IN AN ORGANIZED HEALTH CARE EDUCATION/TRAINING PROGRAM

## 2022-09-28 PROCEDURE — 84630 ASSAY OF ZINC: CPT | Performed by: STUDENT IN AN ORGANIZED HEALTH CARE EDUCATION/TRAINING PROGRAM

## 2022-09-28 PROCEDURE — 83521 IG LIGHT CHAINS FREE EACH: CPT | Performed by: STUDENT IN AN ORGANIZED HEALTH CARE EDUCATION/TRAINING PROGRAM

## 2022-09-28 PROCEDURE — 83921 ORGANIC ACID SINGLE QUANT: CPT | Performed by: STUDENT IN AN ORGANIZED HEALTH CARE EDUCATION/TRAINING PROGRAM

## 2022-09-28 PROCEDURE — 83550 IRON BINDING TEST: CPT | Performed by: STUDENT IN AN ORGANIZED HEALTH CARE EDUCATION/TRAINING PROGRAM

## 2022-09-28 PROCEDURE — 85025 COMPLETE CBC W/AUTO DIFF WBC: CPT | Performed by: STUDENT IN AN ORGANIZED HEALTH CARE EDUCATION/TRAINING PROGRAM

## 2022-09-28 PROCEDURE — 83615 LACTATE (LD) (LDH) ENZYME: CPT | Performed by: STUDENT IN AN ORGANIZED HEALTH CARE EDUCATION/TRAINING PROGRAM

## 2022-09-28 PROCEDURE — 84443 ASSAY THYROID STIM HORMONE: CPT | Performed by: STUDENT IN AN ORGANIZED HEALTH CARE EDUCATION/TRAINING PROGRAM

## 2022-09-28 PROCEDURE — 82525 ASSAY OF COPPER: CPT | Performed by: STUDENT IN AN ORGANIZED HEALTH CARE EDUCATION/TRAINING PROGRAM

## 2022-09-28 PROCEDURE — 83010 ASSAY OF HAPTOGLOBIN QUANT: CPT | Performed by: STUDENT IN AN ORGANIZED HEALTH CARE EDUCATION/TRAINING PROGRAM

## 2022-09-29 LAB
ALBUMIN SERPL ELPH-MCNC: 4.1 G/DL (ref 3.5–4.9)
ALPHA 1: 0.3 G/DL (ref 0.2–0.4)
ALPHA2 GLOB SERPL ELPH-MCNC: 0.7 G/DL (ref 0.5–0.9)
B-GLOBULIN SERPL ELPH-MCNC: 0.9 G/DL (ref 0.7–1.2)
GAMMA GLOB SERPL ELPH-MCNC: 1.2 G/DL (ref 0.7–1.7)
GLOBULIN SER-MCNC: 3.1 G/DL (ref 2.1–4.2)
IGA SERPL-MCNC: 314 MG/DL (ref 70–400)
IGG SERPL-MCNC: 1190 MG/DL (ref 700–1600)
IGM SERPL-MCNC: 150 MG/DL (ref 40–230)
KAPPA LC FREE SER NEPH-MCNC: 2.64 MG/DL (ref 0.33–1.94)
KAPPA LC/LAMBDA SER: 1.22 {RATIO} (ref 0.26–1.65)
LAMBDA LC FREE SER NEPH-MCNC: 2.17 MG/DL (ref 0.57–2.63)
PATH INTERP BLD-IMP: ABNORMAL
PATH INTERP SPEC-IMP: ABNORMAL
PROT SERPL-MCNC: 7.2 G/DL (ref 6.4–8.2)

## 2022-09-30 LAB
COPPER SERPL-MCNC: 112 MCG/DL (ref 80–155)
ZINC SERPL-MCNC: 104 MCG/DL (ref 70–120)

## 2022-10-01 ENCOUNTER — HOSPITAL ENCOUNTER (OUTPATIENT)
Dept: ULTRASOUND IMAGING | Age: 52
Discharge: HOME | End: 2022-10-01
Attending: INTERNAL MEDICINE

## 2022-10-01 DIAGNOSIS — R10.11 RUQ PAIN: ICD-10-CM

## 2022-10-01 DIAGNOSIS — K21.9 GERD (GASTROESOPHAGEAL REFLUX DISEASE): ICD-10-CM

## 2022-10-01 LAB — METHYLMALONATE SERPL-SCNC: 0.12 UMOL/L (ref 0–0.4)

## 2022-10-01 PROCEDURE — 76700 US EXAM ABDOM COMPLETE: CPT

## 2022-10-04 DIAGNOSIS — E83.19 IRON OVERLOAD: Primary | ICD-10-CM

## 2022-10-10 LAB
HFE GENE MUT ANL BLD/T: NORMAL
HFE P.C282Y BLD/T QL: NOT DETECTED
HFE P.H63D BLD/T QL: NOT DETECTED
SERVICE CMNT-IMP: NORMAL

## 2022-12-06 ENCOUNTER — TELEPHONE (OUTPATIENT)
Dept: OBGYN | Age: 52
End: 2022-12-06

## 2022-12-06 ENCOUNTER — TELEPHONE (OUTPATIENT)
Dept: SCHEDULING | Age: 52
End: 2022-12-06

## 2022-12-06 DIAGNOSIS — K62.9 PERIANAL LESION: Primary | ICD-10-CM

## 2022-12-14 ENCOUNTER — HOSPITAL ENCOUNTER (OUTPATIENT)
Dept: MRI IMAGING | Age: 52
Discharge: HOME | End: 2022-12-14
Attending: INTERNAL MEDICINE

## 2022-12-14 DIAGNOSIS — R10.2 PELVIC PAIN: ICD-10-CM

## 2022-12-14 PROCEDURE — 10002805 HB CONTRAST AGENT

## 2022-12-14 PROCEDURE — A9585 GADOBUTROL INJECTION: HCPCS

## 2022-12-14 PROCEDURE — 72197 MRI PELVIS W/O & W/DYE: CPT

## 2022-12-14 RX ORDER — GADOBUTROL 604.72 MG/ML
7.5 INJECTION INTRAVENOUS ONCE
Status: COMPLETED | OUTPATIENT
Start: 2022-12-14 | End: 2022-12-14

## 2022-12-14 RX ADMIN — GADOBUTROL 7.5 ML: 604.72 INJECTION INTRAVENOUS at 14:13

## 2022-12-15 ENCOUNTER — OFFICE VISIT (OUTPATIENT)
Dept: OBGYN | Age: 52
End: 2022-12-15

## 2022-12-15 VITALS
DIASTOLIC BLOOD PRESSURE: 78 MMHG | WEIGHT: 143.63 LBS | BODY MASS INDEX: 23.08 KG/M2 | SYSTOLIC BLOOD PRESSURE: 127 MMHG | HEART RATE: 68 BPM | HEIGHT: 66 IN

## 2022-12-15 DIAGNOSIS — N90.89 LABIAL LESION: Primary | ICD-10-CM

## 2022-12-15 PROBLEM — Z86.2 H/O MACROCYTOSIS: Status: ACTIVE | Noted: 2022-12-15

## 2022-12-15 PROCEDURE — 99203 OFFICE O/P NEW LOW 30 MIN: CPT | Performed by: OBSTETRICS & GYNECOLOGY

## 2022-12-15 RX ORDER — RIFAXIMIN 550 MG/1
TABLET ORAL
COMMUNITY
Start: 2022-12-12 | End: 2023-01-10 | Stop reason: ALTCHOICE

## 2022-12-15 ASSESSMENT — PATIENT HEALTH QUESTIONNAIRE - PHQ9
2. FEELING DOWN, DEPRESSED OR HOPELESS: NOT AT ALL
CLINICAL INTERPRETATION OF PHQ2 SCORE: NO FURTHER SCREENING NEEDED
1. LITTLE INTEREST OR PLEASURE IN DOING THINGS: NOT AT ALL
SUM OF ALL RESPONSES TO PHQ9 QUESTIONS 1 AND 2: 0
SUM OF ALL RESPONSES TO PHQ9 QUESTIONS 1 AND 2: 0

## 2023-01-10 ENCOUNTER — OFFICE VISIT (OUTPATIENT)
Dept: OBGYN | Age: 53
End: 2023-01-10

## 2023-01-10 VITALS
BODY MASS INDEX: 23.59 KG/M2 | SYSTOLIC BLOOD PRESSURE: 121 MMHG | HEIGHT: 66 IN | WEIGHT: 146.8 LBS | DIASTOLIC BLOOD PRESSURE: 81 MMHG | HEART RATE: 56 BPM

## 2023-01-10 DIAGNOSIS — L02.818 CUTANEOUS ABSCESS OF OTHER SITE: Primary | ICD-10-CM

## 2023-01-10 PROCEDURE — 99212 OFFICE O/P EST SF 10 MIN: CPT | Performed by: OBSTETRICS & GYNECOLOGY

## 2023-01-10 PROCEDURE — 10040 EXTRACTION: CPT | Performed by: OBSTETRICS & GYNECOLOGY

## 2023-01-13 ENCOUNTER — TELEPHONE (OUTPATIENT)
Dept: OBGYN | Age: 53
End: 2023-01-13

## 2023-01-18 ENCOUNTER — OFFICE VISIT (OUTPATIENT)
Dept: OBGYN | Age: 53
End: 2023-01-18

## 2023-01-18 VITALS
BODY MASS INDEX: 23.83 KG/M2 | WEIGHT: 148.3 LBS | HEIGHT: 66 IN | SYSTOLIC BLOOD PRESSURE: 120 MMHG | DIASTOLIC BLOOD PRESSURE: 82 MMHG | HEART RATE: 73 BPM | TEMPERATURE: 98.1 F

## 2023-01-18 DIAGNOSIS — N90.89 LABIAL LESION: Primary | ICD-10-CM

## 2023-01-18 PROCEDURE — 99024 POSTOP FOLLOW-UP VISIT: CPT | Performed by: OBSTETRICS & GYNECOLOGY

## 2023-01-18 RX ORDER — ESTRADIOL 0.1 MG/G
2 CREAM VAGINAL DAILY
COMMUNITY

## 2023-01-18 RX ORDER — CLINDAMYCIN PHOSPHATE 10 MG/G
GEL TOPICAL
COMMUNITY
Start: 2023-01-12

## 2023-02-03 ENCOUNTER — HOSPITAL ENCOUNTER (OUTPATIENT)
Dept: GENERAL RADIOLOGY | Age: 53
Discharge: HOME | End: 2023-02-03

## 2023-02-03 ENCOUNTER — TELEPHONE (OUTPATIENT)
Dept: OTHER | Age: 53
End: 2023-02-03

## 2023-02-03 DIAGNOSIS — R07.81 PLEURITIC CHEST PAIN: ICD-10-CM

## 2023-02-03 PROCEDURE — 71101 X-RAY EXAM UNILAT RIBS/CHEST: CPT

## 2023-05-17 ENCOUNTER — HOSPITAL ENCOUNTER (OUTPATIENT)
Dept: LAB | Age: 53
End: 2023-05-17

## 2023-05-18 ENCOUNTER — HOSPITAL ENCOUNTER (OUTPATIENT)
Dept: LAB | Age: 53
Discharge: HOME | End: 2023-05-18

## 2023-05-18 DIAGNOSIS — R10.10 UPPER ABDOMINAL PAIN, UNSPECIFIED: Primary | ICD-10-CM

## 2023-05-18 LAB
BUN SERPL-MCNC: 13 MG/DL (ref 6–20)
BUN/CREAT SERPL: 19 (ref 7–25)
CREAT SERPL-MCNC: 0.7 MG/DL (ref 0.51–0.95)
GFR SERPLBLD BASED ON 1.73 SQ M-ARVRAT: >90 ML/MIN
LIPASE SERPL-CCNC: 272 UNITS/L (ref 73–393)

## 2023-05-18 PROCEDURE — 36415 COLL VENOUS BLD VENIPUNCTURE: CPT | Performed by: CLINICAL MEDICAL LABORATORY

## 2023-05-18 PROCEDURE — 82565 ASSAY OF CREATININE: CPT | Performed by: CLINICAL MEDICAL LABORATORY

## 2023-05-18 PROCEDURE — 84520 ASSAY OF UREA NITROGEN: CPT | Performed by: CLINICAL MEDICAL LABORATORY

## 2023-05-18 PROCEDURE — 83690 ASSAY OF LIPASE: CPT | Performed by: CLINICAL MEDICAL LABORATORY

## 2023-05-19 DIAGNOSIS — R14.0 BLOATING: ICD-10-CM

## 2023-05-19 DIAGNOSIS — R19.5 ABNORMAL FECES: Primary | ICD-10-CM

## 2023-05-19 DIAGNOSIS — R10.10 UPPER ABDOMINAL PAIN: ICD-10-CM

## 2023-05-23 DIAGNOSIS — R10.10 UPPER ABDOMINAL PAIN: ICD-10-CM

## 2023-05-23 DIAGNOSIS — R14.0 BLOATING: Primary | ICD-10-CM

## 2023-05-23 DIAGNOSIS — R19.5 LOW FECAL ELASTASE LEVEL: ICD-10-CM

## 2023-05-30 ENCOUNTER — HOSPITAL ENCOUNTER (OUTPATIENT)
Dept: CT IMAGING | Age: 53
End: 2023-05-30
Attending: INTERNAL MEDICINE

## 2023-05-31 ENCOUNTER — HOSPITAL ENCOUNTER (OUTPATIENT)
Dept: CT IMAGING | Age: 53
Discharge: HOME | End: 2023-05-31
Attending: INTERNAL MEDICINE

## 2023-05-31 DIAGNOSIS — R14.0 BLOATING: ICD-10-CM

## 2023-05-31 DIAGNOSIS — R19.5 ABNORMAL FECES: ICD-10-CM

## 2023-05-31 DIAGNOSIS — R10.10 UPPER ABDOMINAL PAIN: ICD-10-CM

## 2023-05-31 PROCEDURE — G1004 CDSM NDSC: HCPCS

## 2023-05-31 PROCEDURE — 10002805 HB CONTRAST AGENT: Performed by: INTERNAL MEDICINE

## 2023-05-31 PROCEDURE — 74177 CT ABD & PELVIS W/CONTRAST: CPT

## 2023-05-31 RX ADMIN — IOHEXOL 100 ML: 350 INJECTION, SOLUTION INTRAVENOUS at 09:04

## 2023-11-24 ENCOUNTER — TELEPHONE (OUTPATIENT)
Dept: OBGYN | Age: 53
End: 2023-11-24

## 2023-12-08 ENCOUNTER — OFFICE VISIT (OUTPATIENT)
Dept: OBGYN | Age: 53
End: 2023-12-08

## 2023-12-08 VITALS
OXYGEN SATURATION: 100 % | WEIGHT: 151.46 LBS | HEIGHT: 66 IN | DIASTOLIC BLOOD PRESSURE: 79 MMHG | BODY MASS INDEX: 24.34 KG/M2 | HEART RATE: 63 BPM | SYSTOLIC BLOOD PRESSURE: 132 MMHG | TEMPERATURE: 98 F

## 2023-12-08 DIAGNOSIS — N39.0 URINARY TRACT INFECTION WITHOUT HEMATURIA, SITE UNSPECIFIED: Primary | ICD-10-CM

## 2023-12-08 DIAGNOSIS — N89.8 VAGINAL IRRITATION: ICD-10-CM

## 2023-12-08 DIAGNOSIS — R35.0 URINARY FREQUENCY: ICD-10-CM

## 2023-12-08 LAB
APPEARANCE, POC: CLEAR
BILIRUBIN, POC: NEGATIVE
COLOR, POC: YELLOW
GLUCOSE UR-MCNC: NEGATIVE MG/DL
KETONES, POC: NEGATIVE MG/DL
NITRITE, POC: NEGATIVE
OCCULT BLOOD, POC: ABNORMAL
PH UR: 7.5 [PH] (ref 5–7)
PROT UR-MCNC: NEGATIVE MG/DL
SP GR UR: 1.02 (ref 1–1.03)
UROBILINOGEN UR-MCNC: 0.2 MG/DL (ref 0–1)
WBC (LEUKOCYTE) ESTERASE, POC: NEGATIVE

## 2023-12-08 PROCEDURE — 87661 TRICHOMONAS VAGINALIS AMPLIF: CPT | Performed by: CLINICAL MEDICAL LABORATORY

## 2023-12-08 PROCEDURE — 99213 OFFICE O/P EST LOW 20 MIN: CPT | Performed by: OBSTETRICS & GYNECOLOGY

## 2023-12-08 PROCEDURE — 87481 CANDIDA DNA AMP PROBE: CPT | Performed by: CLINICAL MEDICAL LABORATORY

## 2023-12-08 PROCEDURE — 81513 NFCT DS BV RNA VAG FLU ALG: CPT | Performed by: CLINICAL MEDICAL LABORATORY

## 2023-12-08 PROCEDURE — 87077 CULTURE AEROBIC IDENTIFY: CPT | Performed by: CLINICAL MEDICAL LABORATORY

## 2023-12-08 PROCEDURE — 87086 URINE CULTURE/COLONY COUNT: CPT | Performed by: CLINICAL MEDICAL LABORATORY

## 2023-12-11 LAB
BACTERIA UR CULT: ABNORMAL
BACTERIAL VAGINOSIS VAG-IMP: NOT DETECTED
C ALBICANS DNA VAG QL NAA+PROBE: NOT DETECTED
C GLABRATA DNA VAG QL NAA+PROBE: NOT DETECTED
SERVICE CMNT-IMP: NORMAL
T VAGINALIS DNA VAG QL NAA+PROBE: NOT DETECTED

## 2024-01-15 ENCOUNTER — OFFICE VISIT (OUTPATIENT)
Dept: OTOLARYNGOLOGY | Facility: CLINIC | Age: 54
End: 2024-01-15
Payer: COMMERCIAL

## 2024-01-15 VITALS — HEIGHT: 66 IN | BODY MASS INDEX: 24.11 KG/M2 | WEIGHT: 150 LBS

## 2024-01-15 DIAGNOSIS — R09.81 NASAL CONGESTION: ICD-10-CM

## 2024-01-15 DIAGNOSIS — M26.609 TMJ (TEMPOROMANDIBULAR JOINT DISORDER): Primary | ICD-10-CM

## 2024-01-15 DIAGNOSIS — R51.9 FACIAL PAIN: ICD-10-CM

## 2024-01-15 NOTE — PROGRESS NOTES
Rachel Davenport is a 53 year old female.   Chief Complaint   Patient presents with    Sinus Problem     Sinus and nasal congestion, with headaches    Throat Problem     Some throat irritation    Ear Problem     Ear fullness       ASSESSMENT AND PLAN:   1. TMJ (temporomandibular joint disorder)  53-year-old presents with persistent fullness and complaints in both of her ears.  This has been going on for 3 or 4 weeks.  She does have a history of clenching her teeth at night uses a nightguard for the past 7 years.  Sometimes uses a roller to massage her TMJ areas.  Also reports 2 or 3 days of nasal congestion and forehead pressure she has been using Sudafed for which helps a little bit    On exam she had some shifting of her temporomandibular joints.  Otologic exam was very normal with no otitis or effusion.  Mild posterior pharyngeal erythema    Otologic symptoms may be related to her history of clenching at night.  She may want to try meeting with her dentist if she needs any update with her nightguard if it is still fitting properly.  Also discussed over-the-counter anti-inflammatories and oral decongestions with regards to her sinus congestion and TMJ.  She is agreeable with this plan    2. Facial pain      3. Nasal congestion        The patient indicates understanding of these issues and agrees to the plan.      EXAM:   Ht 5' 6\" (1.676 m)   Wt 150 lb (68 kg)   BMI 24.21 kg/m²     Pertinent exam findings may also be noted above in assessment and plan     System Details   Skin Inspection - Normal.   Constitutional Overall appearance - Normal.   Head/Face Symmetric, TMJ tenderness not present    Eyes EOMI, PERRL   Right ear:  Canal clear, TM intact, no LEON   Left ear:  Canal clear, TM intact, no LEON   Nose: Septum midline, inferior turbinates not enlarged, nasal valves without collapse    Oral cavity/Oropharynx: No lesions or masses on inspection or palpation, tonsils symmetric    Neck: Soft without LAD, thyroid  not enlarged  Voice clear/ no stridor   Other:      Scopes and Procedures:             Current Outpatient Medications   Medication Sig Dispense Refill    Multiple Vitamins-Minerals (MULTIVITAMIN ADULTS OR) Take by mouth daily.      Cholecalciferol (VITAMIN D3 OR) Take 6,000 Units by mouth daily.      NON FORMULARY Dysbio, 2 drops 3 times/day sublingual  biocidin 1 drop 3 times/day  gradefruit seed extract 1 capsule 3 times/day      RESTASIS 0.05 % Ophthalmic Emulsion INSTILL 1 DROP INTO BOTH EYES BID.  3      Past Medical History:   Diagnosis Date    Acne     Acute meniscal tear of right knee     s/p arthroscopy     Arthritis     Chronic UTI (urinary tract infection)     Esophageal reflux     Family history of colonic polyps     colonoscopy  was normal    Gastro-esophageal reflux disease with esophagitis     Hemorrhoids     History of bladder infections     IBS (irritable bowel syndrome)     Meningitis     at age 12 or 14    Pneumonia 2017    PONV (postoperative nausea and vomiting)     S/P cholecystectomy       Social History:  Social History     Socioeconomic History    Marital status:    Tobacco Use    Smoking status: Former     Packs/day: 0.50     Years: 20.00     Additional pack years: 0.00     Total pack years: 10.00     Types: Cigarettes     Quit date: 2010     Years since quittin.5    Smokeless tobacco: Never   Vaping Use    Vaping Use: Never used   Substance and Sexual Activity    Alcohol use: Yes    Drug use: No          Mir Soto MD  1/15/2024  3:10 PM

## 2024-02-09 ENCOUNTER — TELEPHONE (OUTPATIENT)
Dept: OBGYN | Age: 54
End: 2024-02-09

## 2024-02-12 ENCOUNTER — APPOINTMENT (OUTPATIENT)
Dept: OBGYN | Age: 54
End: 2024-02-12

## 2024-02-12 VITALS — DIASTOLIC BLOOD PRESSURE: 87 MMHG | HEART RATE: 66 BPM | SYSTOLIC BLOOD PRESSURE: 131 MMHG

## 2024-02-12 DIAGNOSIS — L73.9 FOLLICULITIS: Primary | ICD-10-CM

## 2024-02-12 PROCEDURE — 99213 OFFICE O/P EST LOW 20 MIN: CPT | Performed by: OBSTETRICS & GYNECOLOGY

## 2024-02-12 RX ORDER — SULFAMETHOXAZOLE AND TRIMETHOPRIM 400; 80 MG/1; MG/1
1 TABLET ORAL DAILY
COMMUNITY
End: 2024-02-14

## 2024-02-26 ENCOUNTER — OFFICE VISIT (OUTPATIENT)
Facility: CLINIC | Age: 54
End: 2024-02-26
Payer: COMMERCIAL

## 2024-02-26 DIAGNOSIS — R10.2 PELVIC PAIN: Primary | ICD-10-CM

## 2024-03-09 NOTE — PROGRESS NOTES
Samer F. Najjar, MD  Vascular Surgery  [facility]      VASCULAR SURGERY   CLINIC CONSULT NOTE        Name: Rachel Davenport   :   1970  [id number]     REFERRING PHYSICIAN:  No ref. provider found  PRIMARY CARE PHYSICIAN:  CECILIA RAMOS    HISTORY OF PRESENT ILLNESS:   Patient is a 53 year old female who is here to reestablish care.  She is well-known to me from 4 years back where she had undergone left renal vein transposition for nutcracker symptoms.  At that time, her abdominal heaviness and pelvic pain had improved significantly.  Her subsequent ultrasounds performed at [facility] were negative for any restenosis.  The last 1 was done on February 15 that revealed excellent flow through her left renal vein.  The patient has always dealt with abdominal distention and pelvic fullness issues.  She tells me that she did develop an abdominal hernia after my surgery that was repaired by a  plastic surgeon at [facility].    PAST MEDICAL HISTORY:    Past Medical History:   Diagnosis Date    Acne     Acute meniscal tear of right knee     s/p arthroscopy     Arthritis     Chronic UTI (urinary tract infection)     Esophageal reflux     Family history of colonic polyps     colonoscopy  was normal    Gastro-esophageal reflux disease with esophagitis     Hemorrhoids     History of bladder infections     IBS (irritable bowel syndrome)     Meningitis (HCC)     at age 12 or 14    Pneumonia 2017    PONV (postoperative nausea and vomiting)     S/P cholecystectomy        PAST SURGICAL HISTORY:   Past Surgical History:   Procedure Laterality Date    ARTHROSCOPY OF JOINT UNLISTED            CHOLECYSTECTOMY      COLONOSCOPY      COLONOSCOPY      EGD  2007    KNEE SURGERY      2006    OTHER SURGICAL HISTORY  2008    knee arthroscopy        MEDICATIONS:     Current Outpatient Medications:     Multiple Vitamins-Minerals (MULTIVITAMIN ADULTS OR), Take by  mouth daily., Disp: , Rfl:     NON FORMULARY, Dysbio, 2 drops 3 times/day sublingual biocidin 1 drop 3 times/day gradefruit seed extract 1 capsule 3 times/day, Disp: , Rfl:     Cholecalciferol (VITAMIN D3 OR), Take 6,000 Units by mouth daily., Disp: , Rfl:     RESTASIS 0.05 % Ophthalmic Emulsion, INSTILL 1 DROP INTO BOTH EYES BID., Disp: , Rfl: 3    ALLERGIES:    She is allergic to penicillins.    SOCIAL HISTORY:    Patient  reports that she quit smoking about 13 years ago. Her smoking use included cigarettes. She has a 10 pack-year smoking history. She has never used smokeless tobacco. She reports current alcohol use. She reports that she does not use drugs.    FAMILY HISTORY:    Patient's family history includes Cancer in her father; Colon Polyps in her father; Depression in an other family member; Heart Disease in her father and paternal grandfather; Hypertension in her father and mother; Prostate Cancer in her father.    ROS:     A 12 point review of systems with pertinent positives and negatives listed in the HPI.    EXAM:    There were no vitals taken for this visit.  GENERAL: alert and orientated X 3, well developed, well nourished, in no apparent distress  PSYCH: normal mood and affect  HEENT: ears and throat are clear  NECK: supple, no lymphadenopathy, thyroid wnl  CAROTID: no bruit   RESPIRATORY: no rales, rhonchi, or wheezes B  CARDIO: RRR without murmur, no murmur, no gallop   ABDOMEN: soft, non-tender with no palpable aneurysm or masses  BACK: normal, no tenderness  SKIN: no rashes, warm and dry  EXTREMITIES: no tenderness  NEURO: no sensory or motor deficits  VASCULAR:      Femoral Popliteal DP PT Peroneal   Right 2+       palpable, weak palpable, weak    Left 2+       palpable, weak palpable, weak        LABS:   No results found for: \"EAG\", \"A1C\"   Lab Results   Component Value Date    GLU 80 12/29/2019    BUN 11 12/29/2019    CREATSERUM 0.78 12/29/2019    BUNCREA 14.1 12/29/2019    ANIONGAP 4  12/29/2019    GFRAA 118 04/28/2021    GFRNAA 103 04/28/2021    CA 9.9 12/29/2019     12/29/2019    K 3.7 12/29/2019     12/29/2019    CO2 28.0 12/29/2019    OSMOCALC 284 12/29/2019      Lab Results   Component Value Date    WBC 8.4 12/07/2019    RBC 3.20 (L) 12/07/2019    HGB 11.5 (L) 12/07/2019    HCT 32.7 (L) 12/07/2019    .2 (H) 12/07/2019    MCH 35.9 (H) 12/07/2019    MCHC 35.2 12/07/2019    RDW 12.0 12/07/2019    .0 (L) 12/07/2019    MPV 8.5 08/16/2017        Lab Results   Component Value Date    HGB 11.5 (L) 12/07/2019    HGB 11.4 (L) 12/06/2019    HGB 11.7 (L) 12/05/2019    HGB 13.1 12/04/2019    HGB 13.0 11/30/2019    HGB 12.9 09/22/2019    CREATSERUM 0.78 12/29/2019    CREATSERUM 0.91 12/07/2019    CREATSERUM 1.02 12/06/2019    CREATSERUM 1.19 (H) 12/05/2019    CREATSERUM 0.93 12/04/2019    CREATSERUM 0.72 11/30/2019       IMAGING:       ASSESSMENT  Diagnoses and all orders for this visit:    Pelvic pain    The patient is doing well overall following surgical treatment of her nutcracker symptoms involving the left renal vein.  She continues to have some pelvic fullness and abdominal bloating.  I explained to her that I do not believe this is due to the nutcracker syndrome but she may ultimately benefit by being evaluated by Dr. Howard from interventional radiology to see if she has any ovarian vein reflux that could be causing this.  He can decide on the appropriate imaging study that can potentially demonstrate the presence or absence of pelvic venous reflux  As for her left renal vein we will continue with the abdominal ultrasound on a yearly basis    PLAN:  Renal arterial and venous ultrasound in 1 year which we will arrange.    The patient indicated an understanding of these issues and agreed to the plan and all questions were answered during the clinic visit.      Thank you for allowing me to participate in your patient's care.   Please do not hesitate to contact me with any  questions.    Sincerely,  Samer F. Najjar, MD    Please note: Dragon speech recognition software was used to prepare this note. If a word or phrase is confusing, it is likely do to a failure of recognition.   Please contact me with any questions or clarifications.

## 2024-04-09 ENCOUNTER — TELEPHONE (OUTPATIENT)
Dept: OTHER | Age: 54
End: 2024-04-09

## 2024-05-23 ENCOUNTER — APPOINTMENT (OUTPATIENT)
Dept: CT IMAGING | Age: 54
End: 2024-05-23
Attending: INTERNAL MEDICINE

## 2024-05-23 DIAGNOSIS — E78.5 HYPERLIPIDEMIA: ICD-10-CM

## 2024-08-28 ENCOUNTER — HOSPITAL ENCOUNTER (OUTPATIENT)
Dept: CT IMAGING | Age: 54
Discharge: HOME | End: 2024-08-28
Attending: RADIOLOGY
Payer: COMMERCIAL

## 2024-08-28 DIAGNOSIS — I87.2 VENOUS (PERIPHERAL) INSUFFICIENCY: ICD-10-CM

## 2024-08-28 PROCEDURE — 74177 CT ABD & PELVIS W/CONTRAST: CPT | Performed by: RADIOLOGY

## 2024-09-06 ENCOUNTER — APPOINTMENT (OUTPATIENT)
Dept: LAB | Age: 54
End: 2024-09-06

## 2024-09-06 DIAGNOSIS — K63.89 OTHER SPECIFIED DISEASES OF INTESTINE: Primary | ICD-10-CM

## 2024-09-06 PROCEDURE — 91065 BREATH HYDROGEN/METHANE TEST: CPT | Performed by: CLINICAL MEDICAL LABORATORY

## 2024-09-09 ENCOUNTER — TELEPHONE (OUTPATIENT)
Dept: OBGYN | Age: 54
End: 2024-09-09

## 2024-09-09 SDOH — ECONOMIC STABILITY: HOUSING INSECURITY: DO YOU HAVE PROBLEMS WITH ANY OF THE FOLLOWING?: NONE OF THE ABOVE

## 2024-09-09 SDOH — ECONOMIC STABILITY: TRANSPORTATION INSECURITY
IN THE PAST 12 MONTHS, HAS LACK OF RELIABLE TRANSPORTATION KEPT YOU FROM MEDICAL APPOINTMENTS, MEETINGS, WORK OR FROM GETTING THINGS NEEDED FOR DAILY LIVING?: NO

## 2024-09-09 SDOH — ECONOMIC STABILITY: FOOD INSECURITY: WITHIN THE PAST 12 MONTHS, THE FOOD YOU BOUGHT JUST DIDN'T LAST AND YOU DIDN'T HAVE MONEY TO GET MORE.: NEVER TRUE

## 2024-09-09 SDOH — HEALTH STABILITY: PHYSICAL HEALTH: ON AVERAGE, HOW MANY DAYS PER WEEK DO YOU ENGAGE IN MODERATE TO STRENUOUS EXERCISE (LIKE A BRISK WALK)?: 5 DAYS

## 2024-09-09 SDOH — ECONOMIC STABILITY: HOUSING INSECURITY: WHAT IS YOUR LIVING SITUATION TODAY?: I HAVE A STEADY PLACE TO LIVE

## 2024-09-09 SDOH — ECONOMIC STABILITY: GENERAL
IN THE PAST YEAR, HAVE YOU OR ANY FAMILY MEMBERS YOU LIVE WITH BEEN UNABLE TO GET ANY OF THE FOLLOWING WHEN IT WAS REALLY NEEDED? CHECK ALL THAT APPLY.: NONE;PATIENT DECLINED

## 2024-09-09 SDOH — SOCIAL STABILITY: SOCIAL NETWORK
HOW OFTEN DO YOU SEE OR TALK TO PEOPLE THAT YOU CARE ABOUT AND FEEL CLOSE TO? (FOR EXAMPLE: TALKING TO FRIENDS ON THE PHONE, VISITING FRIENDS OR FAMILY, GOING TO CHURCH OR CLUB MEETINGS): 3 TO 5 TIMES A WEEK

## 2024-09-09 SDOH — HEALTH STABILITY: PHYSICAL HEALTH: ON AVERAGE, HOW MANY MINUTES DO YOU ENGAGE IN EXERCISE AT THIS LEVEL?: 60 MIN

## 2024-09-09 ASSESSMENT — LIFESTYLE VARIABLES
HOW OFTEN DO YOU HAVE A DRINK CONTAINING ALCOHOL: 2 TO 4 TIMES PER MONTH
HOW MANY STANDARD DRINKS CONTAINING ALCOHOL DO YOU HAVE ON A TYPICAL DAY: 0,1 OR 2
AUDIT-C TOTAL SCORE: 2

## 2024-09-09 ASSESSMENT — SOCIAL DETERMINANTS OF HEALTH (SDOH): IN THE PAST 12 MONTHS, HAS THE ELECTRIC, GAS, OIL, OR WATER COMPANY THREATENED TO SHUT OFF SERVICE IN YOUR HOME?: NO

## 2024-09-10 ENCOUNTER — OFFICE VISIT (OUTPATIENT)
Dept: OBGYN | Age: 54
End: 2024-09-10

## 2024-09-10 VITALS
DIASTOLIC BLOOD PRESSURE: 84 MMHG | HEART RATE: 72 BPM | BODY MASS INDEX: 24.41 KG/M2 | HEIGHT: 66 IN | SYSTOLIC BLOOD PRESSURE: 129 MMHG | WEIGHT: 151.9 LBS

## 2024-09-10 DIAGNOSIS — N89.8 VAGINAL IRRITATION: Primary | ICD-10-CM

## 2024-09-10 LAB
APPEARANCE, POC: CLEAR
BILIRUBIN, POC: NEGATIVE
COLOR, POC: YELLOW
GLUCOSE UR-MCNC: NEGATIVE MG/DL
KETONES, POC: NEGATIVE MG/DL
NITRITE, POC: NEGATIVE
OCCULT BLOOD, POC: ABNORMAL
PH UR: 6 [PH] (ref 5–7)
PROT UR-MCNC: NEGATIVE MG/DL
SP GR UR: <= 1.005 (ref 1–1.03)
UROBILINOGEN UR-MCNC: 0.2 MG/DL (ref 0–1)
WBC (LEUKOCYTE) ESTERASE, POC: NEGATIVE

## 2024-09-10 PROCEDURE — 87086 URINE CULTURE/COLONY COUNT: CPT | Performed by: CLINICAL MEDICAL LABORATORY

## 2024-09-10 PROCEDURE — 87661 TRICHOMONAS VAGINALIS AMPLIF: CPT | Performed by: CLINICAL MEDICAL LABORATORY

## 2024-09-10 PROCEDURE — 81513 NFCT DS BV RNA VAG FLU ALG: CPT | Performed by: CLINICAL MEDICAL LABORATORY

## 2024-09-10 PROCEDURE — 87481 CANDIDA DNA AMP PROBE: CPT | Performed by: CLINICAL MEDICAL LABORATORY

## 2024-09-10 RX ORDER — DOXYCYCLINE 100 MG/1
CAPSULE ORAL
COMMUNITY
Start: 2024-09-09

## 2024-09-11 LAB
BACTERIA UR CULT: NO GROWTH
BACTERIAL VAGINOSIS VAG-IMP: NOT DETECTED
C ALBICANS DNA VAG QL NAA+PROBE: NOT DETECTED
C GLABRATA DNA VAG QL NAA+PROBE: NOT DETECTED
SERVICE CMNT-IMP: NORMAL
SERVICE CMNT-IMP: NORMAL
T VAGINALIS DNA VAG QL NAA+PROBE: NOT DETECTED

## 2024-09-13 DIAGNOSIS — J32.0 CHRONIC MAXILLARY SINUSITIS: Primary | ICD-10-CM

## 2024-09-17 ENCOUNTER — LAB ENCOUNTER (OUTPATIENT)
Dept: LAB | Facility: HOSPITAL | Age: 54
End: 2024-09-17
Attending: RADIOLOGY
Payer: COMMERCIAL

## 2024-09-17 DIAGNOSIS — Z01.818 PRE-OP TESTING: ICD-10-CM

## 2024-09-17 LAB
ANION GAP SERPL CALC-SCNC: 4 MMOL/L (ref 0–18)
BASOPHILS # BLD AUTO: 0.05 X10(3) UL (ref 0–0.2)
BASOPHILS NFR BLD AUTO: 0.6 %
BUN BLD-MCNC: 10 MG/DL (ref 9–23)
BUN/CREAT SERPL: 10.2 (ref 10–20)
CALCIUM BLD-MCNC: 9.6 MG/DL (ref 8.7–10.4)
CHLORIDE SERPL-SCNC: 107 MMOL/L (ref 98–112)
CO2 SERPL-SCNC: 29 MMOL/L (ref 21–32)
CREAT BLD-MCNC: 0.98 MG/DL
DEPRECATED RDW RBC AUTO: 45.7 FL (ref 35.1–46.3)
EGFRCR SERPLBLD CKD-EPI 2021: 70 ML/MIN/1.73M2 (ref 60–?)
EOSINOPHIL # BLD AUTO: 0.24 X10(3) UL (ref 0–0.7)
EOSINOPHIL NFR BLD AUTO: 2.6 %
ERYTHROCYTE [DISTWIDTH] IN BLOOD BY AUTOMATED COUNT: 12.2 % (ref 11–15)
FASTING STATUS PATIENT QL REPORTED: NO
GLUCOSE BLD-MCNC: 88 MG/DL (ref 70–99)
HCT VFR BLD AUTO: 36 %
HGB BLD-MCNC: 12.7 G/DL
IMM GRANULOCYTES # BLD AUTO: 0.04 X10(3) UL (ref 0–1)
IMM GRANULOCYTES NFR BLD: 0.4 %
LYMPHOCYTES # BLD AUTO: 1.8 X10(3) UL (ref 1–4)
LYMPHOCYTES NFR BLD AUTO: 19.8 %
MCH RBC QN AUTO: 35.6 PG (ref 26–34)
MCHC RBC AUTO-ENTMCNC: 35.3 G/DL (ref 31–37)
MCV RBC AUTO: 100.8 FL
MONOCYTES # BLD AUTO: 0.79 X10(3) UL (ref 0.1–1)
MONOCYTES NFR BLD AUTO: 8.7 %
NEUTROPHILS # BLD AUTO: 6.17 X10 (3) UL (ref 1.5–7.7)
NEUTROPHILS # BLD AUTO: 6.17 X10(3) UL (ref 1.5–7.7)
NEUTROPHILS NFR BLD AUTO: 67.9 %
OSMOLALITY SERPL CALC.SUM OF ELEC: 288 MOSM/KG (ref 275–295)
PLATELET # BLD AUTO: 199 10(3)UL (ref 150–450)
POTASSIUM SERPL-SCNC: 3.8 MMOL/L (ref 3.5–5.1)
RBC # BLD AUTO: 3.57 X10(6)UL
SODIUM SERPL-SCNC: 140 MMOL/L (ref 136–145)
WBC # BLD AUTO: 9.1 X10(3) UL (ref 4–11)

## 2024-09-17 PROCEDURE — 85025 COMPLETE CBC W/AUTO DIFF WBC: CPT

## 2024-09-17 PROCEDURE — 80048 BASIC METABOLIC PNL TOTAL CA: CPT

## 2024-09-17 PROCEDURE — 36415 COLL VENOUS BLD VENIPUNCTURE: CPT

## 2024-09-19 ENCOUNTER — HOSPITAL ENCOUNTER (OUTPATIENT)
Dept: CT IMAGING | Age: 54
Discharge: HOME | End: 2024-09-19
Attending: OTOLARYNGOLOGY

## 2024-09-19 DIAGNOSIS — J32.0 CHRONIC MAXILLARY SINUSITIS: ICD-10-CM

## 2024-09-19 PROCEDURE — 70486 CT MAXILLOFACIAL W/O DYE: CPT

## 2024-09-20 ENCOUNTER — HOSPITAL ENCOUNTER (OUTPATIENT)
Dept: INTERVENTIONAL RADIOLOGY/VASCULAR | Facility: HOSPITAL | Age: 54
Discharge: HOME | End: 2024-09-20
Attending: RADIOLOGY | Admitting: RADIOLOGY
Payer: COMMERCIAL

## 2024-09-20 ENCOUNTER — APPOINTMENT (OUTPATIENT)
Dept: CT IMAGING | Age: 54
End: 2024-09-20
Attending: OTOLARYNGOLOGY

## 2024-09-20 VITALS
RESPIRATION RATE: 15 BRPM | DIASTOLIC BLOOD PRESSURE: 73 MMHG | SYSTOLIC BLOOD PRESSURE: 135 MMHG | WEIGHT: 149 LBS | HEIGHT: 66 IN | TEMPERATURE: 98 F | OXYGEN SATURATION: 100 % | BODY MASS INDEX: 23.95 KG/M2 | HEART RATE: 57 BPM

## 2024-09-20 DIAGNOSIS — I86.2 PELVIC VARICES: ICD-10-CM

## 2024-09-20 DIAGNOSIS — I87.2 VENOUS INSUFFICIENCY: ICD-10-CM

## 2024-09-20 LAB
B-HCG UR QL: NEGATIVE
SERVICE CMNT-IMP: NORMAL

## 2024-09-20 PROCEDURE — 36011 PLACE CATHETER IN VEIN: CPT | Performed by: RADIOLOGY

## 2024-09-20 PROCEDURE — 067D3DZ DILATION OF LEFT COMMON ILIAC VEIN WITH INTRALUMINAL DEVICE, PERCUTANEOUS APPROACH: ICD-10-PCS | Performed by: RADIOLOGY

## 2024-09-20 PROCEDURE — 37238 OPEN/PERQ PLACE STENT SAME: CPT | Performed by: RADIOLOGY

## 2024-09-20 PROCEDURE — B51G1ZZ FLUOROSCOPY OF LEFT PELVIC (ILIAC) VEINS USING LOW OSMOLAR CONTRAST: ICD-10-PCS | Performed by: RADIOLOGY

## 2024-09-20 PROCEDURE — B54CZZ3 ULTRASONOGRAPHY OF LEFT LOWER EXTREMITY VEINS, INTRAVASCULAR: ICD-10-PCS | Performed by: RADIOLOGY

## 2024-09-20 PROCEDURE — B51K1ZZ FLUOROSCOPY OF LEFT RENAL VEIN USING LOW OSMOLAR CONTRAST: ICD-10-PCS | Performed by: RADIOLOGY

## 2024-09-20 PROCEDURE — 81025 URINE PREGNANCY TEST: CPT

## 2024-09-20 PROCEDURE — 99152 MOD SED SAME PHYS/QHP 5/>YRS: CPT | Performed by: RADIOLOGY

## 2024-09-20 PROCEDURE — 99153 MOD SED SAME PHYS/QHP EA: CPT | Performed by: RADIOLOGY

## 2024-09-20 PROCEDURE — 37252 INTRVASC US NONCORONARY 1ST: CPT | Performed by: RADIOLOGY

## 2024-09-20 RX ORDER — SODIUM CHLORIDE 9 MG/ML
INJECTION, SOLUTION INTRAVENOUS CONTINUOUS
Status: DISCONTINUED | OUTPATIENT
Start: 2024-09-20 | End: 2024-09-20

## 2024-09-20 RX ORDER — ASPIRIN 81 MG/1
81 TABLET ORAL DAILY
Qty: 45 TABLET | Refills: 0 | Status: SHIPPED | OUTPATIENT
Start: 2024-09-20

## 2024-09-20 RX ORDER — MIDAZOLAM HYDROCHLORIDE 1 MG/ML
INJECTION INTRAMUSCULAR; INTRAVENOUS
Status: COMPLETED
Start: 2024-09-20 | End: 2024-09-20

## 2024-09-20 RX ORDER — IOPAMIDOL 612 MG/ML
100 INJECTION, SOLUTION INTRAVASCULAR
Status: COMPLETED | OUTPATIENT
Start: 2024-09-20 | End: 2024-09-20

## 2024-09-20 RX ORDER — ASPIRIN 81 MG/1
81 TABLET ORAL DAILY
Qty: 30 TABLET | Refills: 11 | Status: SHIPPED | OUTPATIENT
Start: 2024-09-20 | End: 2024-09-20

## 2024-09-20 RX ORDER — LIDOCAINE HYDROCHLORIDE 20 MG/ML
INJECTION, SOLUTION EPIDURAL; INFILTRATION; INTRACAUDAL; PERINEURAL
Status: COMPLETED
Start: 2024-09-20 | End: 2024-09-20

## 2024-09-20 RX ORDER — HEPARIN SODIUM 1000 [USP'U]/ML
INJECTION, SOLUTION INTRAVENOUS; SUBCUTANEOUS
Status: COMPLETED
Start: 2024-09-20 | End: 2024-09-20

## 2024-09-20 RX ADMIN — IOPAMIDOL 30 ML: 612 INJECTION, SOLUTION INTRAVASCULAR at 13:59:00

## 2024-09-20 NOTE — DISCHARGE INSTRUCTIONS
INTERVENTIONAL RADIOLOGY  [facility]  [phone]     Proper skin puncture site care is important during the healing period. This guideline should help to remind you of theverbal instructions you received from your physician or nurse.   Site Care   You may shower 24 hours after the procedure. Remove the band-aid/clear dressing and gently wash the site with soap and water.     Take off dressing, Sept 21 after 2 pm  Gently pat dry. Do not rub or scratch the area. Do not sit in a bathtub, swimming pool or whirlpool for 5-7 days.  Do not apply powder or lotion to the site. Inspect the site daily.     Call the physician immediately if you experience any of the following:  Unusual pain at the groin site or down the affected leg.  Signs of infection: Redness, warmth, drainage (other than small amount of blood on the dressing),chills, or temperature greater than 100 degrees F.  Increased swelling or heavy bleeding from or around the site. If this occurs, lie flat, hold pressure overthe site and call 911. Any bruising will fade within one or two weeks. A hematoma (a collection of blood in the tissue) may be painful to touch but should decrease in size and tenderness within one or two weeks.  Activity   No bending, squatting or lifting anything heavier than 10 lbs for 5-7 days.  If you are discharged home the same day as the procedure, you cannot drive home due to the relaxingmedication given during the procedure.  Driving may be resumed 24-48 hours after the procedure unless otherwise instructed by your physician.    Contact Interventional Radiology at [phone] if you have severe/unrelieved pain, fever, chills, dizziness/lightheadedness, or drainage/bleeding from your incision site.

## 2024-09-20 NOTE — IVS NOTE
Hand-Off     Procedure hand off report given to Jacy ROSAS.   Pt's vital signs are stable.   Procedural access site is dry and intact with no signs or symptoms of bleeding or hematoma.   Bedrest status until 1750  Dr. Stanford  spoke with patient/family post procedure.

## 2024-09-20 NOTE — IVS NOTE
DISCHARGE NOTE     Pt is able to sit up and ambulate without difficulty.   Pt voided and tolerated fluids and food.   Procedural site remains dry and intact with good circulation, motion, and sensation.   No signs and symptoms of bleeding/hematoma noted.   IV access removed  Instruction provided, patient/family verbalizes understanding.   Dr. Howard spoke with patient/family post procedure.     Patient with cramping and back pain- hx of herniated discs. Dr. Howard notified. Patient took her home ibuprofen for relief    Pt discharge via wheelchair to "[facility]

## 2024-09-20 NOTE — H&P
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  S/p renal vein transposition for suspected nutcracker syndrome, with persistent pain, evidence of iliac vein compression on CT    Medications Prior to Admission   Medication Sig Dispense Refill Last Dose    NUTRITIONAL SUPPLEMENTS OR Take 2 capsules by mouth 3 (three) times daily with meals.   Past Week    Multiple Vitamins-Minerals (MULTIVITAMIN ADULTS OR) Take 1 tablet by mouth daily.   Past Week    Cholecalciferol (VITAMIN D3 OR) Take 6,000 Units by mouth daily.   Past Week     Current Facility-Administered Medications   Medication Dose Route Frequency    sodium chloride 0.9% infusion   Intravenous Continuous       Allergies:   Allergies   Allergen Reactions    Penicillins NAUSEA AND VOMITING     As child       Past Medical History:    Acne    Acute meniscal tear of right knee    s/p arthroscopy     Arthritis    Chronic UTI (urinary tract infection)    Esophageal reflux    Family history of colonic polyps    colonoscopy  was normal    Gastro-esophageal reflux disease with esophagitis    Hemorrhoids    History of bladder infections    IBS (irritable bowel syndrome)    Meningitis (HCC)    at age 12 or 14    Pneumonia    PONV (postoperative nausea and vomiting)    S/P cholecystectomy     Past Surgical History:   Procedure Laterality Date    Arthroscopy of joint unlisted            Cholecystectomy      Colonoscopy      Colonoscopy      Egd  2007    Knee surgery      2006    Other surgical history  2008    knee arthroscopy     Family History   Problem Relation Age of Onset    Depression Other     Heart Disease Father     Prostate Cancer Father     Colon Polyps Father     Cancer Father         prostate    Hypertension Father     Heart Disease  Paternal Grandfather     Hypertension Mother      Social History     Tobacco Use    Smoking status: Former     Current packs/day: 0.00     Average packs/day: 0.5 packs/day for 20.0 years (10.0 ttl pk-yrs)     Types: Cigarettes     Start date: 1990     Quit date: 2010     Years since quittin.1    Smokeless tobacco: Never   Substance Use Topics    Alcohol use: Yes       SYSTEM Check if Review is Normal Check if Physical Exam is Normal If not normal, please explain:   HEENT [x ] [x ]    NECK & BACK [x ] [x ]    HEART [x ] [x ]    LUNGS [x ] [ x]    ABDOMEN [x ] [ x]          EXTREMITIES [x ] [ x]            [ x ] I have discussed the risks and benefits and alternatives with the patient/family.  They understand and agree to proceed with plan of care.  [ x ] I have reviewed the History and Physical done within the last 30 days.  Any changes noted above.    Héctor Howard MD  2024  1:58 PM

## 2024-11-08 ENCOUNTER — WALK IN (OUTPATIENT)
Dept: URGENT CARE | Age: 54
End: 2024-11-08

## 2024-11-08 VITALS
DIASTOLIC BLOOD PRESSURE: 78 MMHG | TEMPERATURE: 98.1 F | SYSTOLIC BLOOD PRESSURE: 129 MMHG | RESPIRATION RATE: 18 BRPM | HEIGHT: 66 IN | HEART RATE: 62 BPM | BODY MASS INDEX: 24.52 KG/M2 | OXYGEN SATURATION: 99 %

## 2024-11-08 DIAGNOSIS — R35.0 URINE FREQUENCY: ICD-10-CM

## 2024-11-08 DIAGNOSIS — R39.89 SUSPECTED URINARY TRACT INFECTION: Primary | ICD-10-CM

## 2024-11-08 LAB
APPEARANCE, POC: CLEAR
BILIRUB UR QL STRIP: NEGATIVE
COLOR UR: YELLOW
GLUCOSE UR-MCNC: NEGATIVE MG/DL
HGB UR QL STRIP: ABNORMAL
INTERNAL PROCEDURAL CONTROLS ACCEPTABLE: YES
KETONES UR STRIP-MCNC: NEGATIVE MG/DL
LEUKOCYTE ESTERASE UR QL STRIP: NEGATIVE
NITRITE UR QL STRIP: NEGATIVE
PH UR: 5 [PH] (ref 5–7)
PROT UR-MCNC: NEGATIVE MG/DL
SP GR UR: 1.01 (ref 1–1.03)
TEST LOT EXPIRATION DATE: ABNORMAL
TEST LOT NUMBER: ABNORMAL
UROBILINOGEN UR-MCNC: 0.2 MG/DL (ref 0–1)

## 2024-11-08 RX ORDER — NITROFURANTOIN 25; 75 MG/1; MG/1
100 CAPSULE ORAL 2 TIMES DAILY
Qty: 10 CAPSULE | Refills: 0 | Status: SHIPPED | OUTPATIENT
Start: 2024-11-08 | End: 2024-11-13

## 2024-11-08 ASSESSMENT — ENCOUNTER SYMPTOMS
BACK PAIN: 0
CONSTITUTIONAL NEGATIVE: 1

## 2024-11-10 LAB — BACTERIA UR CULT: NORMAL

## 2024-12-16 ENCOUNTER — HOSPITAL ENCOUNTER (OUTPATIENT)
Dept: ULTRASOUND IMAGING | Facility: HOSPITAL | Age: 54
Discharge: HOME | End: 2024-12-16
Attending: RADIOLOGY
Payer: COMMERCIAL

## 2024-12-16 DIAGNOSIS — I87.1 MAY-THURNER SYNDROME: ICD-10-CM

## 2024-12-16 PROCEDURE — 93971 EXTREMITY STUDY: CPT | Performed by: RADIOLOGY

## 2025-03-04 ENCOUNTER — APPOINTMENT (OUTPATIENT)
Dept: OBGYN | Age: 55
End: 2025-03-04

## 2025-03-04 VITALS
BODY MASS INDEX: 24.53 KG/M2 | HEART RATE: 62 BPM | SYSTOLIC BLOOD PRESSURE: 126 MMHG | WEIGHT: 152 LBS | DIASTOLIC BLOOD PRESSURE: 84 MMHG | OXYGEN SATURATION: 99 %

## 2025-03-04 DIAGNOSIS — R14.0 BLOATING: Primary | ICD-10-CM

## 2025-03-04 PROCEDURE — 99213 OFFICE O/P EST LOW 20 MIN: CPT | Performed by: OBSTETRICS & GYNECOLOGY

## 2025-03-07 ENCOUNTER — IMAGING SERVICES (OUTPATIENT)
Age: 55
End: 2025-03-07

## 2025-03-07 DIAGNOSIS — R14.0 BLOATING: ICD-10-CM

## 2025-03-07 PROCEDURE — 76830 TRANSVAGINAL US NON-OB: CPT | Performed by: OBSTETRICS & GYNECOLOGY

## 2025-03-07 PROCEDURE — 76856 US EXAM PELVIC COMPLETE: CPT | Performed by: OBSTETRICS & GYNECOLOGY

## 2025-03-26 NOTE — DISCHARGE SUMMARY
Disorganized Spoke with patient. States she is managing PF with home program and wants to look into acupuncture/natropathic therapies for long term management. Stated she is okay to be discharged and will be starting PT for TMJ.

## 2025-06-12 NOTE — H&P
[facility] - Gastroenterology                                                                                                                 Requesting physician or provider: CECILIA RAMOS    Chief Complaint   Patient presents with    New Patient     Patient has sour taste bile taste        HPI:   Rachel Davenport is a 54 year old year-old female with history of ventral hernia and abdominal plasty, chronic abdominal distension, constipation,Small Intestinal Bacterial Overgrowth (SIBO), Exocrine Pancreatic Insufficiency (EPI), Gastroesophageal Reflux Disease (GERD), and Eosinophilic Esophagitis (EOE).  Pt has been managed by multiple G[facility] in the [facility], [facility], Dr. Jeronimo- [place], Dr. Orozco, Dr. Gonsalves.     Pt presents today with the c/o increased abdominal churning and bile reflux. Pt states there is a sour taste in the mouth. Additional symptoms include acid reflux, increased belching. Reports 2 episodes of vomiting with bile, water and coffee ground emesis. Pt has tried to follow an anti-inflammatory diet which was not helpful.   Additionally, pt reports hx of constipation for which pt takes digestive enzymes by a company by [employer], which last been helping. Reports 2-3 bms a day. Pt was prescribed linzess and trulance, did not take it.  However pt continues to have increased bloating- reports that her lymphatic systems have stopped which might be the cause of bloating.  Side note: son has recently been diagnosed with crohn's disease  Denies: melena, hematochezia, hematemesis, abd surgery, loss of appetite, changes in stool or bowel habits, N/V/D, weight gain/loss    Prior endoscopies:  Egd and colonoscopy 2023- unremarkable except diverticulosis.    Soc:  -quit smoking- 15yrs ago  -occasional Etoh    Last seen by Dr. Cardoso at [facility]:  VISIT SUMMARY:    During today's visit, we discussed your ongoing gastrointestinal symptoms,  including chronic abdominal distension, constipation, and your history of Small Intestinal Bacterial Overgrowth (SIBO), Exocrine Pancreatic Insufficiency (EPI), Gastroesophageal Reflux Disease (GERD), and Eosinophilic Esophagitis (EOE). We reviewed your recent tests and procedures, including a colonoscopy and CT scan, and developed a comprehensive plan to address your symptoms.    YOUR PLAN:    -SMALL INTESTINAL BACTERIAL OVERGROWTH (SIBO): SIBO is a condition where there is an excessive growth of bacteria in the small intestine, leading to symptoms like bloating and diarrhea. We will start you on Rifaximin 550mg three times a day for two weeks to help reduce the bacterial overgrowth.    -CONSTIPATION: Constipation is a condition where you have infrequent or difficult bowel movements. We will start you on Trulance 3mg daily to help improve your bowel movements. Additionally, we will order an abdominal x-ray to assess the severity of your constipation. If Trulance does not provide relief, we may consider a test called anorectal manometry to check for pelvic floor dysfunction.    -EOSINOPHILIC ESOPHAGITIS (EOE): EOE is an allergic condition that causes inflammation of the esophagus.Recommend continuing pantoprazole. If your dysphagia worsens, recommend alternative treatment and coming back to clinic.     -ABDOMINAL DISTENTION: Abdominal distention is the swelling or bloating of the abdomen. If your distention worsens, we may need to perform a CT scan. We also recommend discussing this issue with your gynecologist, given the location of the distention in the pelvic area.    INSTRUCTIONS:    Please follow up with the abdominal x-ray as ordered. If you do not experience relief from constipation with Trulance, we may need to perform additional tests. Continue your current management plan for EOE with your gastroenterologist. If your abdominal distention worsens, please contact us for further evaluation and consider  discussing it with your gynecologist.   Wt Readings from Last 6 Encounters:   25 158 lb (71.7 kg)   24 149 lb (67.6 kg)   01/15/24 150 lb (68 kg)   21 137 lb (62.1 kg)   21 135 lb (61.2 kg)   20 130 lb 6.4 oz (59.1 kg)        History, Medications, Allergies, ROS:      Past Medical History:    Acne    Acute meniscal tear of right knee    s/p arthroscopy     Arthritis    Chronic UTI (urinary tract infection)    Esophageal reflux    Family history of colonic polyps    colonoscopy  was normal    Gastro-esophageal reflux disease with esophagitis    Hemorrhoids    History of bladder infections    IBS (irritable bowel syndrome)    Meningitis (HCC)    at age 12 or 14    Pneumonia    PONV (postoperative nausea and vomiting)    S/P cholecystectomy      Past Surgical History:   Procedure Laterality Date    Arthroscopy of joint unlisted            Cholecystectomy      Colonoscopy  2013    Colonoscopy      Colonoscopy  2023    at [facility] and EGD    Egd  2007    Knee surgery      2006    Other surgical history  2008    knee arthroscopy      Family Hx:   Family History   Problem Relation Age of Onset    Heart Disease Father     Prostate Cancer Father     Colon Polyps Father     Cancer Father         prostate    Hypertension Father     Hypertension Mother     Heart Disease Paternal Grandfather     Depression Other     Colon Cancer Neg       Social History:   Social History     Socioeconomic History    Marital status:    Tobacco Use    Smoking status: Former     Current packs/day: 0.00     Average packs/day: 0.5 packs/day for 20.0 years (10.0 ttl pk-yrs)     Types: Cigarettes     Start date: 1990     Quit date: 2010     Years since quittin.9     Passive exposure: Never    Smokeless tobacco: Never   Vaping Use    Vaping status: Never Used   Substance and Sexual Activity    Alcohol use: Yes    Drug use: No     Social Drivers of Health      Food Insecurity: Low Risk  (9/9/2024)    Received from [facility]    Food Insecurity     Within the past 12 months, you worried that your food would run out before you got money to buy more.  : Never true     Within the past 12 months, the food you bought just didn't last and you didn't have money to get more. : Never true   Transportation Needs: Not At Risk (9/9/2024)    Received from [facility]    Transportation Needs     In the past 12 months, has lack of reliable transportation kept you from medical appointments, meetings, work or from getting things needed for daily living? : No   Stress: Medium Risk (9/9/2024)    Received from [facility]    Stress     Stress is when someone feels tense, nervous, anxious, or can't sleep at night because their mind is troubled. How stressed are you? : Somewhat    Received from [facility]    Housing Stability        Medications (Active prior to today's visit):  Current Outpatient Medications   Medication Sig Dispense Refill    clindamycin 1 % External Lotion as needed.      Tretinoin 0.1 % External Cream as needed.      famotidine 40 MG Oral Tab Take 1 tablet (40 mg total) by mouth daily. 90 tablet 2    NUTRITIONAL SUPPLEMENTS OR Take 2 capsules by mouth 3 (three) times daily with meals.      Multiple Vitamins-Minerals (MULTIVITAMIN ADULTS OR) Take 1 tablet by mouth daily.      Cholecalciferol (VITAMIN D3 OR) Take 6,000 Units by mouth daily.      Probiotic Product (PROBIOTIC-10 OR) Take by mouth As Directed.      estradiol 0.1 MG/GM Vaginal Cream APPLY A SMALL AMOUNT TO THE URETHRAL OPENING DAILY      aspirin 81 MG Oral Tab EC Take 1 tablet (81 mg total) by mouth daily. (Patient not taking: Reported on 6/13/2025) 45 tablet 0       Allergies:  Allergies   Allergen Reactions    Cephalexin RASH and SWELLING    Penicillins NAUSEA AND VOMITING     As child       ROS:   CONSTITUTIONAL:  negative for fevers, rigors  EYES:   negative for diplopia   RESPIRATORY:  negative for severe shortness of breath  CARDIOVASCULAR:  negative for crushing sub-sternal chest pain  GASTROINTESTINAL:  see HPI  GENITOURINARY:  negative for dysuria or gross hematuria  INTEGUMENT/BREAST:  SKIN:  negative for jaundice   ALLERGIC/IMMUNOLOGIC:  negative for hay fever  ENDOCRINE:  negative for cold intolerance and heat intolerance  MUSCULOSKELETAL:  negative for joint effusion/severe erythema  BEHAVIOR/PSYCH:  negative for psychotic behavior      PHYSICAL EXAM:   Blood pressure 126/81, pulse 65, height 5' 6\" (1.676 m), weight 158 lb (71.7 kg), not currently breastfeeding.    Gen- Patient appears comfortable and in no acute discomfort  HEENT: the sclera appears anicteric, oropharynx clear, mucus membranes appear moist  CV- regular rate and rhythm, the extremities are warm and well perfused   Lung- Moves air well; No labored breathing  Abdomen- soft, non-tender exam in all quadrants without rigidity or guarding, non-distended, no abnormal bowel sounds noted, no masses are palpated  Skin- No jaundice  Ext: no cyanosis, clubbing or edema is evident.   Neuro- Alert and interactive, and gross movements of extremities normal  Psych - appropriate, non-agitated    Labs/Imaging:     Patient's pertinent labs and imaging were reviewed and discussed with patient today.      Lab Results   Component Value Date    GLU 88 09/17/2024    GLU 80 12/29/2019    GLU 93 12/07/2019     09/17/2024     12/29/2019     12/07/2019    K 3.8 09/17/2024    K 3.7 12/29/2019    K 3.5 12/07/2019     09/17/2024     12/29/2019     12/07/2019    CO2 29.0 09/17/2024    CO2 28.0 12/29/2019    CO2 24.0 12/07/2019    ANIONGAP 4 09/17/2024    ANIONGAP 4 12/29/2019    ANIONGAP 6 12/07/2019    BUN 10 09/17/2024    BUN 11 12/29/2019    BUN 10 12/07/2019    CREATSERUM 0.98 09/17/2024    CREATSERUM 0.78 12/29/2019    CREATSERUM 0.91 12/07/2019    BUNCREA 10.2 09/17/2024     BUNCREA 14.1 12/29/2019    BUNCREA 11.0 12/07/2019    CA 9.6 09/17/2024    CA 9.9 12/29/2019    CA 7.9 (L) 12/07/2019    OSMOCALC 288 09/17/2024    OSMOCALC 284 12/29/2019    OSMOCALC 289 12/07/2019    EGFRCR 70 09/17/2024    ALT 17 09/22/2019    ALT 48 08/15/2017    ALT 21 10/20/2014    AST 12 (L) 09/22/2019    AST 40 08/15/2017    AST 22 10/20/2014    ALKPHO 59 09/22/2019    ALKPHO 79 08/15/2017    ALKPHO 70 10/20/2014    BILT 0.3 09/22/2019    BILT 0.3 08/15/2017    TP 7.1 09/22/2019    TP 6.0 08/15/2017    TP 7.8 10/20/2014    ALB 3.5 09/22/2019    ALB 3.0 (L) 08/15/2017    ALB 4.9 10/20/2014    GLOBULIN 3.6 09/22/2019    GLOBULIN 3.0 08/15/2017    ELECTAG 1.0 09/22/2019    ELECTAG 1.0 08/15/2017    FASTING No 09/17/2024    FASTING No 12/29/2019    FASTING Yes 11/30/2019       Lab Results   Component Value Date    RBC 3.57 (L) 09/17/2024    RBC 3.20 (L) 12/07/2019    RBC 3.25 (L) 12/06/2019    HGB 12.7 09/17/2024    HGB 11.5 (L) 12/07/2019    HGB 11.4 (L) 12/06/2019    HCT 36.0 09/17/2024    HCT 32.7 (L) 12/07/2019    HCT 32.7 (L) 12/06/2019    .8 (H) 09/17/2024    .2 (H) 12/07/2019    .6 (H) 12/06/2019    MCH 35.6 (H) 09/17/2024    MCH 35.9 (H) 12/07/2019    MCH 35.1 (H) 12/06/2019    MCHC 35.3 09/17/2024    MCHC 35.2 12/07/2019    MCHC 34.9 12/06/2019    RDW 12.2 09/17/2024    RDW 12.0 12/07/2019    RDW 12.1 12/06/2019    NEPRELIM 6.17 09/17/2024    NEPRELIM 6.65 12/07/2019    NEPRELIM 12.55 (H) 12/06/2019    WBC 9.1 09/17/2024    WBC 8.4 12/07/2019    WBC 14.3 (H) 12/06/2019    .0 09/17/2024    .0 (L) 12/07/2019    .0 (L) 12/06/2019          ASSESSMENT/PLAN:   Rachel Penaloza [facility] is a 54 year old year-old female with history of ventral hernia and abdominal plasty, chronic abdominal distension, constipation,Small Intestinal Bacterial Overgrowth (SIBO), Exocrine Pancreatic Insufficiency (EPI), Gastroesophageal Reflux Disease (GERD), and Eosinophilic Esophagitis  (EOE).  Pt has [facility] the [facility], Dr. Jeronimo- [place], Dr. Orozco, Dr. Gonsalves.     Pt presents today with the c/o increased abdominal churning and bile reflux. Pt states there is a sour taste in the mouth. Additional symptoms include acid reflux, increased belching. Reports 2 episodes of vomiting with bile, water and coffee ground emesis. Pt has tried to follow an anti-inflammatory diet which was not helpful.   Additionally, pt reports hx of constipation for which pt takes digestive enzymes by a company by [employer], which last been helping. Reports 2-3 bms a day. Pt was prescribed linzess and trulance, did not take it.  However pt continues to have increased bloating- reports that her lymphatic systems have stopped which might be the cause of bloating.  Side note: son has recently been diagnosed with crohn's disease  Denies: melena, hematochezia, hematemesis, abd surgery, loss of appetite, changes in stool or bowel habits, N/V/D, weight gain/loss  Pt has a long hx with GI related problems. Pt has tried ppi in the past which she states did not help. States that previous cholecystectomy has led to increased bile production which is what causes reflux and sour taste.   Ddx peptic ulcer, gastritis, IBS-C&D, gerd  Future considerations: CT abdomen/pelvis, egd, ph testing    1. Gastroesophageal reflux disease without esophagitis  - Helicobacter Pylori Breath Test, Adult; Future  - XR UGI/ESOPHAGUS DOUBLE CONTRAST (CPT=74246); Future    2. Altered taste  - Helicobacter Pylori Breath Test, Adult; Future  - XR UGI/ESOPHAGUS DOUBLE CONTRAST (CPT=74246); Future    3. Bloating  - Helicobacter Pylori Breath Test, Adult; Future  - XR UGI/ESOPHAGUS DOUBLE CONTRAST (CPT=74246); Future      Recommendations:    # acid reflux, sour taste  - h. Pylori test  - esphogram  - trail famotidine 40mg daily  - avoid triggers    # bloating  - trial IB guard as  needed for increased bloating  - avoid triggering foods  - avoid constipation  - continue digestive enzymes    Follow up in 2-3 months        Orders This Visit:  Orders Placed This Encounter   Procedures    Helicobacter Pylori Breath Test, Adult       Meds This Visit:  Requested Prescriptions     Signed Prescriptions Disp Refills    famotidine 40 MG Oral Tab 90 tablet 2     Sig: Take 1 tablet (40 mg total) by mouth daily.       Imaging & Referrals:  XR UGI/ESOPHAGUS DOUBLE CONTRAST (CPT=74246)         CAROLINE Santillan   6/13/2025

## 2025-06-13 ENCOUNTER — LAB ENCOUNTER (OUTPATIENT)
Dept: LAB | Facility: HOSPITAL | Age: 55
End: 2025-06-13
Payer: COMMERCIAL

## 2025-06-13 ENCOUNTER — OFFICE VISIT (OUTPATIENT)
Facility: CLINIC | Age: 55
End: 2025-06-13

## 2025-06-13 VITALS
WEIGHT: 158 LBS | HEIGHT: 66 IN | BODY MASS INDEX: 25.39 KG/M2 | DIASTOLIC BLOOD PRESSURE: 81 MMHG | HEART RATE: 65 BPM | SYSTOLIC BLOOD PRESSURE: 126 MMHG

## 2025-06-13 DIAGNOSIS — R43.2 ALTERED TASTE: ICD-10-CM

## 2025-06-13 DIAGNOSIS — R14.0 BLOATING: ICD-10-CM

## 2025-06-13 DIAGNOSIS — K21.9 GASTROESOPHAGEAL REFLUX DISEASE WITHOUT ESOPHAGITIS: ICD-10-CM

## 2025-06-13 DIAGNOSIS — K21.9 GASTROESOPHAGEAL REFLUX DISEASE WITHOUT ESOPHAGITIS: Primary | ICD-10-CM

## 2025-06-13 PROCEDURE — 83013 H PYLORI (C-13) BREATH: CPT

## 2025-06-13 PROCEDURE — 99204 OFFICE O/P NEW MOD 45 MIN: CPT

## 2025-06-13 RX ORDER — FAMOTIDINE 40 MG/1
40 TABLET, FILM COATED ORAL DAILY
Qty: 90 TABLET | Refills: 2 | Status: SHIPPED | OUTPATIENT
Start: 2025-06-13

## 2025-06-13 RX ORDER — ESTRADIOL 0.1 MG/G
CREAM VAGINAL
COMMUNITY

## 2025-06-13 RX ORDER — CLINDAMYCIN PHOSPHATE 10 UG/ML
LOTION TOPICAL AS NEEDED
COMMUNITY
Start: 2025-01-19

## 2025-06-13 RX ORDER — TRETINOIN 1 MG/G
CREAM TOPICAL AS NEEDED
COMMUNITY
Start: 2023-07-06

## 2025-06-13 NOTE — PATIENT INSTRUCTIONS
# acid reflux, sour taste  - h. Pylori test  - esphogram  - trail famotidine 40mg daily  - avoid triggers    # bloating  - trial IB guard as needed for increased bloating  - avoid triggering foods  - avoid constipation  - continue digestive enzymes    Follow up in 2-3 months

## 2025-06-15 LAB — H PYLORI BREATH TEST: NEGATIVE

## 2025-06-16 ENCOUNTER — PATIENT MESSAGE (OUTPATIENT)
Facility: CLINIC | Age: 55
End: 2025-06-16

## 2025-06-16 ENCOUNTER — HOSPITAL ENCOUNTER (OUTPATIENT)
Dept: GENERAL RADIOLOGY | Facility: HOSPITAL | Age: 55
Discharge: HOME | End: 2025-06-16
Payer: COMMERCIAL

## 2025-06-16 DIAGNOSIS — K21.9 GASTROESOPHAGEAL REFLUX DISEASE WITHOUT ESOPHAGITIS: ICD-10-CM

## 2025-06-16 DIAGNOSIS — R14.0 BLOATING: ICD-10-CM

## 2025-06-16 DIAGNOSIS — R43.2 ALTERED TASTE: ICD-10-CM

## 2025-06-16 PROCEDURE — 74246 X-RAY XM UPR GI TRC 2CNTRST: CPT

## 2025-06-18 ENCOUNTER — PATIENT MESSAGE (OUTPATIENT)
Facility: CLINIC | Age: 55
End: 2025-06-18

## 2025-08-15 ENCOUNTER — OFFICE VISIT (OUTPATIENT)
Facility: CLINIC | Age: 55
End: 2025-08-15

## 2025-08-15 VITALS
BODY MASS INDEX: 25.55 KG/M2 | DIASTOLIC BLOOD PRESSURE: 78 MMHG | HEIGHT: 66 IN | SYSTOLIC BLOOD PRESSURE: 119 MMHG | HEART RATE: 61 BPM | WEIGHT: 159 LBS

## 2025-08-15 DIAGNOSIS — R10.84 GENERALIZED ABDOMINAL PAIN: Primary | ICD-10-CM

## 2025-08-15 DIAGNOSIS — R43.2 ALTERED TASTE: ICD-10-CM

## 2025-08-15 DIAGNOSIS — R14.0 BLOATING: ICD-10-CM

## 2025-08-15 PROCEDURE — 99213 OFFICE O/P EST LOW 20 MIN: CPT

## 2025-08-15 RX ORDER — FAMOTIDINE 40 MG/1
40 TABLET, FILM COATED ORAL DAILY
Qty: 90 TABLET | Refills: 11 | Status: SHIPPED | OUTPATIENT
Start: 2025-08-15

## (undated) DEVICE — TOWEL OR WHITE STRL

## (undated) DEVICE — CV: Brand: MEDLINE INDUSTRIES, INC.

## (undated) DEVICE — HEMOCLIP MED 24 CLIP/CARTRIDGE

## (undated) DEVICE — SUTURE PROLENE 3-0 SH

## (undated) DEVICE — HEMOCLIP HORIZON SM MULTI

## (undated) DEVICE — 3M(TM) TEGADERM(TM) TRANSPARENT FILM DRESSING FRAME STYLE 1628: Brand: 3M™ TEGADERM™

## (undated) DEVICE — SUTURE SILK 2-0 SH

## (undated) DEVICE — SOL  .9 1000ML BTL

## (undated) DEVICE — SUCTION CANISTER, 3000CC,SAFELINER: Brand: DEROYAL

## (undated) DEVICE — ABSORBABLE HEMOSTAT (OXIDIZED REGENERATED CELLULOSE, U.S.P.): Brand: SURGICEL

## (undated) DEVICE — SUTURE PROLENE 6-0 C-1

## (undated) DEVICE — SUTURE PDS II 0 CTX

## (undated) DEVICE — VIOLET BRAIDED (POLYGLACTIN 910), SYNTHETIC ABSORBABLE SUTURE: Brand: COATED VICRYL

## (undated) DEVICE — ENCORE® PERRY STYLE 42 PF SIZE 8, STERILE LATEX POWDER-FREE SURGICAL GLOVE: Brand: ENCORE

## (undated) DEVICE — SPONGE LAP 18X18 XRAY STRL

## (undated) DEVICE — SUTURE SILK 2-0 SA85H

## (undated) DEVICE — SUTURE SILK 0

## (undated) DEVICE — CLAMP INSERTS: Brand: SOFT/TRACTION CLAMP INSERTS

## (undated) DEVICE — UNDYED BRAIDED (POLYGLACTIN 910), SYNTHETIC ABSORBABLE SUTURE: Brand: COATED VICRYL

## (undated) DEVICE — SUTURE PROLENE 4-0 BB

## (undated) DEVICE — DRAPE SHEET LG

## (undated) DEVICE — SUTURE PROLENE 1-0 8845G

## (undated) DEVICE — INTENDED TO BE USED TO OCCLUDE, RETRACT AND IDENTIFY ARTERIES, VEINS, TENDONS AND NERVES IN SURGICAL PROCEDURES: Brand: STERION®  VESSEL LOOP

## (undated) DEVICE — TOWEL OR BLU 16X26 STRL

## (undated) DEVICE — CONTAINER SPEC STR 4OZ GRY LID

## (undated) DEVICE — COVER SGL STRL LGHT HNDL BLU

## (undated) DEVICE — SUTURE PROLENE 5-0 C-1

## (undated) DEVICE — SUTURE VICRYL 2-0 CT-1

## (undated) DEVICE — DERMABOND LIQUID ADHESIVE

## (undated) NOTE — LETTER
[address], [place]  Authorization for Invasive Procedures  1.  I hereby authorize Dr. Salena Izquierdo , my physician and whomever may be designated as the doctor's assistant, to perform the following operation and/or procedure:  Renal Venog performed for the purposes of advancing medicine, science, and/or education, provided my identity is not revealed. If the procedure has been videotaped, the physician/surgeon will obtain the original videotape.  The hospital will not be responsible for stor My signature below affirms that prior to the time of the procedure, I have explained to the patient and/or her legal representative, the risks and benefits involved in the proposed treatment and any reasonable alternative to the proposed treatment.  I have

## (undated) NOTE — ED AVS SNAPSHOT
[facility] Emergency Department    [address]     [place] [address]    Phone:  [phone]    Fax:  [phone]           Ashley Villagran   MRN: [id number]    Department:  [facility] Emergency Department   Date of Visit:  2/27/ covered by your plan. Please contact your insurance company to determine coverage and benefits available for follow-up care and referrals.       If you have difficulty scheduling your follow-up appointment as directed, please call our  at ([phone]) If you believe that any of the medications or instructions on this list is different from what your Primary Care doctor has instructed you - please continue to take your medications as instructed by your Primary Care doctor until you can check with your do coverage. Patient [address] is a Federal Navigator program that can help with your Affordable Care Act coverage, as well as all types of Medicaid plans.   To get signed up and covered, please call [phone] and ask to get set up for an insuran

## (undated) NOTE — LETTER
[address], [place]  Authorization for Invasive Procedures  1.  I hereby authorize Dr. Ferny German , my physician and whomever may be designated as the doctor's assistant, to perform the following operation and/or procedure:  Renal Venog performed for the purposes of advancing medicine, science, and/or education, provided my identity is not revealed. If the procedure has been videotaped, the physician/surgeon will obtain the original videotape.  The hospital will not be responsible for stor My signature below affirms that prior to the time of the procedure, I have explained to the patient and/or her legal representative, the risks and benefits involved in the proposed treatment and any reasonable alternative to the proposed treatment.  I have

## (undated) NOTE — MR AVS SNAPSHOT
[facility] [address]  [phone]               Thank you for choosing us for your health care visit with RICKY Carrera.   We are glad to serve you and happy to provide you with this summary of you STREP GRP A CUL-SCR negative Negative    Control Line Present with a clear background (yes/no) yes Yes/No    Kit Lot # [id number] Numeric    Kit Expiration Date 8/17 Date                  MyChart     Visit [employer]  You can access your MyChart to more acti

## (undated) NOTE — MR AVS SNAPSHOT
[address] [facility]  [phone]               Thank you for choosing us for your health care visit with Kei Alexander MD.  We are glad to serve you and happy to provide you with this summar tested, he or she should stay home from school. Home care  · Rest at home. Drink plenty of fluids so you won't get dehydrated. · If the test is positive for strep, don't go to work or school for the first 2 days of taking the antibiotics.  After this time Call your healthcare provider right away if any of these occur:  · Fever as directed by your healthcare provider. For children, seek care if:  ¨ Your child is of any age and has repeated fevers above 104°F (40°C).   ¨ Your child is younger than 2 years of a Apply topically nightly. MyChart     Visit [employer]  You can access your MyChart to more actively manage your health care and view more details from this visit by going to [email or web address].   If you've recently had a stay at the

## (undated) NOTE — LETTER
Patient Name: Odilia Li  YOB: 1970          MRN number:  [id number]  Date:  9/21/2021  Referring Physician:  Coral Hughes     Initial EVALUATION:   Referring Physician: Dr. 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    Pt denies diplopia, dysarthria, dysphasia, dizziness, drop attacks, bowel/bladder changes, saddle anesthesia, and MARIANO LE N/T.    ASSESSMENT  Rachel presents to physical therapy eval 90%  **Extension: 25% w/ low back stiffness and abdominal tightness   Sidebending: R 75% w/ mild LBP; L 75% w/ mild LBP. Palpation: TTP nancy-incision w/ swelling present.      Strength:  LE   Hip flexion (L2): R 4+/5; L 4+/5  Hip abduction: R 4/5; L 4/5 Management, Therapeutic Activities, Therapeutic Exercise and Home Exercise Program instruction    Education or treatment limitation: None  Rehab Potential:excellent    FOTO: 51/100    Patient/Family/Caregiver was advised of these findings, precautions, and

## (undated) NOTE — LETTER
[facility], [place]  Authorization for Invasive Procedures  1.  I hereby authorize Dr. Cam Randall , my physician and whomever may be designated as the doctor's assistant, to perform the following operation and/or procedure:  Renal Vein performed for the purposes of advancing medicine, science, and/or education, provided my identity is not revealed. If the procedure has been videotaped, the physician/surgeon will obtain the original videotape.  The hospital will not be responsible for stor My signature below affirms that prior to the time of the procedure, I have explained to the patient and/or her legal representative, the risks and benefits involved in the proposed treatment and any reasonable alternative to the proposed treatment.  I have

## (undated) NOTE — ED AVS SNAPSHOT
[facility] Emergency Department    [address]     [place] [address]    Phone:  [phone]    Fax:  [phone]           Zacarias Moya   MRN: [id number]    Department:  [facility] Emergency Department   Date of Visit:  2/27/ and Class Registration line at [phone] or find a doctor online by visiting [email or web address].    IF THERE IS ANY CHANGE OR WORSENING OF YOUR CONDITION, CALL YOUR PRIMARY CARE PHYSICIAN AT ONCE OR RETURN IMMEDIATELY TO [address] [place] [address].     If

## (undated) NOTE — LETTER
Patient Name: Belia Barber  YOB: 1970          MRN :  [id number]  Date:  10/18/2021  Referring Physician:  Fernando Ulloa               [facility]  Pt has attended 7 visits in Physical Therapy.      Dx: Post op - Ventral Hernia rep medically necessary to address deficits and limitations noted above. Pt tolerated PT tx well today. Increased resistance w/ lateral band walks and monster walks for improved hip strength.   Added 3 way SLR in SLS to further challenge balance along wit stretch 4x15\"  R/L modified piriformis stretch 3x15\" R/L modified piriformis stretch 3x15\"     R/L anterior SLR w/ TA brace 2x10 @ bar R/L hip abd SLR - YTB x20 R/L anterior SLR w/ TA brace 2x10      B supine HS bridge x20 - lvl 1  B supine HS curl from